# Patient Record
Sex: FEMALE | Race: WHITE | Employment: FULL TIME | URBAN - METROPOLITAN AREA
[De-identification: names, ages, dates, MRNs, and addresses within clinical notes are randomized per-mention and may not be internally consistent; named-entity substitution may affect disease eponyms.]

---
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
## 2020-01-20 ENCOUNTER — HOSPITAL ENCOUNTER (EMERGENCY)
Age: 20
Discharge: HOME | End: 2020-01-20
Attending: EMERGENCY MEDICINE
Payer: COMMERCIAL

## 2020-01-20 VITALS
HEART RATE: 81 BPM | SYSTOLIC BLOOD PRESSURE: 101 MMHG | DIASTOLIC BLOOD PRESSURE: 71 MMHG | TEMPERATURE: 98 F | RESPIRATION RATE: 18 BRPM | OXYGEN SATURATION: 98 %

## 2020-01-20 DIAGNOSIS — N64.59 BREAST ENGORGEMENT: Primary | ICD-10-CM

## 2020-01-20 DIAGNOSIS — S39.012A BACK STRAIN, INITIAL ENCOUNTER: ICD-10-CM

## 2020-01-20 PROCEDURE — 99282 EMERGENCY DEPT VISIT SF MDM: CPT

## 2020-01-21 NOTE — ED INITIAL ASSESSMENT (HPI)
Pt presents to ER complaining of pain to mid back and bilateral breasts for several days. No injury.

## 2020-01-21 NOTE — ED PROVIDER NOTES
Patient Seen in: [facility] In [place]      History   Patient presents with: Other    Stated Complaint: back pain and breast pain    HPI    Patient is about 9 weeks postpartum and has 2 complaints.     She stopped breast-feeding 4 day SpO2 98 %   O2 Device None (Room air)       Current:/71   Pulse 81   Temp 98.2 °F (36.8 °C) (Oral)   Resp 18   LMP  (LMP Unknown)   SpO2 98%         Physical Exam        Constitutional: Awake, alert, age appearing, non-toxic  Head: Normocephalic an Clinical Impression:  Breast engorgement  (primary encounter diagnosis)  Back strain, initial encounter    Disposition:  Discharge  1/20/2020  9:54 pm    Follow-up:  Zoraida Lofton DO  [address] [place] [address] [facility]

## 2020-02-20 ENCOUNTER — HOSPITAL ENCOUNTER (EMERGENCY)
Age: 20
Discharge: HOME | End: 2020-02-20
Attending: EMERGENCY MEDICINE
Payer: COMMERCIAL

## 2020-02-20 ENCOUNTER — APPOINTMENT (OUTPATIENT)
Dept: GENERAL RADIOLOGY | Age: 20
End: 2020-02-20
Attending: EMERGENCY MEDICINE
Payer: COMMERCIAL

## 2020-02-20 VITALS
WEIGHT: 146 LBS | SYSTOLIC BLOOD PRESSURE: 130 MMHG | DIASTOLIC BLOOD PRESSURE: 62 MMHG | HEIGHT: 64 IN | TEMPERATURE: 99 F | BODY MASS INDEX: 24.92 KG/M2 | RESPIRATION RATE: 16 BRPM | HEART RATE: 119 BPM | OXYGEN SATURATION: 99 %

## 2020-02-20 DIAGNOSIS — J40 BRONCHITIS: Primary | ICD-10-CM

## 2020-02-20 LAB
POCT INFLUENZA A: NEGATIVE
POCT INFLUENZA B: NEGATIVE

## 2020-02-20 PROCEDURE — 71046 X-RAY EXAM CHEST 2 VIEWS: CPT | Performed by: EMERGENCY MEDICINE

## 2020-02-20 PROCEDURE — 94644 CONT INHLJ TX 1ST HOUR: CPT

## 2020-02-20 PROCEDURE — 99284 EMERGENCY DEPT VISIT MOD MDM: CPT

## 2020-02-20 PROCEDURE — 87502 INFLUENZA DNA AMP PROBE: CPT | Performed by: EMERGENCY MEDICINE

## 2020-02-20 RX ORDER — PREDNISONE 20 MG/1
40 TABLET ORAL DAILY
Qty: 10 TABLET | Refills: 0 | Status: SHIPPED | OUTPATIENT
Start: 2020-02-20 | End: 2020-02-25

## 2020-02-20 RX ORDER — IBUPROFEN 600 MG/1
600 TABLET ORAL ONCE
Status: COMPLETED | OUTPATIENT
Start: 2020-02-20 | End: 2020-02-20

## 2020-02-20 RX ORDER — ALBUTEROL SULFATE 90 UG/1
2 AEROSOL, METERED RESPIRATORY (INHALATION) EVERY 4 HOURS PRN
Qty: 1 INHALER | Refills: 0 | Status: SHIPPED | OUTPATIENT
Start: 2020-02-20 | End: 2020-03-21

## 2020-02-20 RX ORDER — PREDNISONE 20 MG/1
60 TABLET ORAL ONCE
Status: COMPLETED | OUTPATIENT
Start: 2020-02-20 | End: 2020-02-20

## 2020-02-20 NOTE — ED PROVIDER NOTES
Patient Seen in: [facility] Emergency Department In [place]      History   Patient presents with:  Cough/URI    Stated Complaint: cough congestion fever    HPI    This is a 22-year-old woman, history of asthma, here for evaluation of dry cough sore throat regular rhythm. Pulmonary:      Effort: Pulmonary effort is normal.      Breath sounds: Wheezing present. No rhonchi or rales. Comments: Expiratory wheezing bilaterally. Abdominal:      General: Abdomen is flat. Palpations: Abdomen is soft.

## 2020-02-21 ENCOUNTER — OFFICE VISIT (OUTPATIENT)
Dept: FAMILY MEDICINE CLINIC | Facility: CLINIC | Age: 20
End: 2020-02-21
Payer: COMMERCIAL

## 2020-02-21 VITALS
OXYGEN SATURATION: 98 % | HEART RATE: 80 BPM | RESPIRATION RATE: 14 BRPM | WEIGHT: 134 LBS | TEMPERATURE: 99 F | SYSTOLIC BLOOD PRESSURE: 110 MMHG | BODY MASS INDEX: 22.88 KG/M2 | HEIGHT: 64 IN | DIASTOLIC BLOOD PRESSURE: 60 MMHG

## 2020-02-21 DIAGNOSIS — R06.2 WHEEZING: ICD-10-CM

## 2020-02-21 DIAGNOSIS — R05.9 COUGH: Primary | ICD-10-CM

## 2020-02-21 DIAGNOSIS — R11.10 POST-TUSSIVE EMESIS: ICD-10-CM

## 2020-02-21 PROCEDURE — 94640 AIRWAY INHALATION TREATMENT: CPT | Performed by: FAMILY MEDICINE

## 2020-02-21 PROCEDURE — 99214 OFFICE O/P EST MOD 30 MIN: CPT | Performed by: FAMILY MEDICINE

## 2020-02-21 RX ORDER — CODEINE PHOSPHATE AND GUAIFENESIN 10; 100 MG/5ML; MG/5ML
5 SOLUTION ORAL EVERY 6 HOURS PRN
Qty: 180 ML | Refills: 0 | Status: SHIPPED | OUTPATIENT
Start: 2020-02-21 | End: 2020-07-01

## 2020-02-21 RX ORDER — ONDANSETRON 4 MG/1
4 TABLET, FILM COATED ORAL EVERY 8 HOURS PRN
Qty: 20 TABLET | Refills: 0 | Status: SHIPPED | OUTPATIENT
Start: 2020-02-21 | End: 2020-07-01

## 2020-02-21 RX ORDER — IPRATROPIUM BROMIDE AND ALBUTEROL SULFATE 2.5; .5 MG/3ML; MG/3ML
3 SOLUTION RESPIRATORY (INHALATION) ONCE
Status: COMPLETED | OUTPATIENT
Start: 2020-02-21 | End: 2020-02-21

## 2020-02-21 RX ADMIN — IPRATROPIUM BROMIDE AND ALBUTEROL SULFATE 3 ML: 2.5; .5 SOLUTION RESPIRATORY (INHALATION) at 10:13:00

## 2020-02-21 NOTE — PROGRESS NOTES
Chief Complaint:  Patient presents with:  Er F/u: On 02- Dx Bronchitis, states she is feeling worse, Vomiting, feeling SOB, Coughing up thick Mucus and Chest Tightness     HPI:  This is a 21year old female patient presenting for Er F/u (On 02-20-20 (post-traumatic stress disorder)       Past Surgical History:   Procedure Laterality Date   • REMOVAL OF TONSILS,12+ Y/O     • TONSILLECTOMY        Family History   Problem Relation Age of Onset   • Asthma Mother    • Crohn's Disease Mother    • Depression mg by mouth every 6 (six) hours as needed for Pain.        Allergies:    Penicillin V            RASH  Penicillins             RASH    EXAM:   02/21/20  0945   BP: 110/60   Pulse: 80   Resp: 14   Temp: 98.6 °F (37 °C)   TempSrc: Oral   SpO2: 98%   Weight: 1

## 2020-02-25 ENCOUNTER — OFFICE VISIT (OUTPATIENT)
Dept: FAMILY MEDICINE CLINIC | Facility: CLINIC | Age: 20
End: 2020-02-25
Payer: COMMERCIAL

## 2020-02-25 VITALS
BODY MASS INDEX: 22.88 KG/M2 | DIASTOLIC BLOOD PRESSURE: 60 MMHG | SYSTOLIC BLOOD PRESSURE: 110 MMHG | HEIGHT: 64 IN | OXYGEN SATURATION: 99 % | WEIGHT: 134 LBS | TEMPERATURE: 99 F | RESPIRATION RATE: 16 BRPM | HEART RATE: 80 BPM

## 2020-02-25 DIAGNOSIS — R05.9 COUGH: ICD-10-CM

## 2020-02-25 DIAGNOSIS — J45.41 MODERATE PERSISTENT ASTHMA WITH EXACERBATION: ICD-10-CM

## 2020-02-25 DIAGNOSIS — R06.2 WHEEZING: Primary | ICD-10-CM

## 2020-02-25 PROCEDURE — 99214 OFFICE O/P EST MOD 30 MIN: CPT | Performed by: FAMILY MEDICINE

## 2020-02-25 PROCEDURE — 94640 AIRWAY INHALATION TREATMENT: CPT | Performed by: FAMILY MEDICINE

## 2020-02-25 RX ORDER — PREDNISONE 10 MG/1
TABLET ORAL
Qty: 20 TABLET | Refills: 0 | Status: SHIPPED | OUTPATIENT
Start: 2020-02-25 | End: 2020-03-25

## 2020-02-25 RX ORDER — IPRATROPIUM BROMIDE AND ALBUTEROL SULFATE 2.5; .5 MG/3ML; MG/3ML
3 SOLUTION RESPIRATORY (INHALATION) ONCE
Status: COMPLETED | OUTPATIENT
Start: 2020-02-25 | End: 2020-02-25

## 2020-02-25 RX ADMIN — IPRATROPIUM BROMIDE AND ALBUTEROL SULFATE 3 ML: 2.5; .5 SOLUTION RESPIRATORY (INHALATION) at 11:32:00

## 2020-02-25 NOTE — PROGRESS NOTES
Chief Complaint:  Patient presents with:  Cough:  Follow Up from 02--states cough and wheezing is worsening, Chest Pain and congestion     HPI:  This is a 21year old female patient presenting for Cough (Follow Up from 02--states cough and whe Onset   • Asthma Mother    • Crohn's Disease Mother    • Depression Maternal Grandmother    • Bipolar Disorder Maternal Grandmother    • Heart Disease Paternal Grandfather    • Diabetes Paternal Grandfather    • Bipolar Disorder Maternal Aunt    • Claudeen Sides (four) hours as needed for Wheezing. 1 Box 1   • Ipratropium-Albuterol  MCG/ACT Inhalation Aero Soln Inhale 2 puffs into the lungs every 4 to 6 hours.  1 Inhaler 1   • acetaminophen 325 MG Oral Tab Take 325 mg by mouth every 6 (six) hours as needed fo taper: predniSONE 10 MG Oral Tab; Take 3 tabs PO x 3d, then 2 tabs PO x 3d, then 1 tab PO x3d, then 1/2 tab PO x 2 d.  -    Obtain XR CHEST PA + LAT CHEST (CPT=71046); Future given continued symptoms.     Concerning signs and symptoms that warrant returning

## 2020-03-25 ENCOUNTER — TELEPHONE (OUTPATIENT)
Dept: FAMILY MEDICINE CLINIC | Facility: CLINIC | Age: 20
End: 2020-03-25

## 2020-03-25 ENCOUNTER — HOSPITAL ENCOUNTER (EMERGENCY)
Age: 20
Discharge: HOME | End: 2020-03-25
Attending: EMERGENCY MEDICINE
Payer: COMMERCIAL

## 2020-03-25 ENCOUNTER — APPOINTMENT (OUTPATIENT)
Dept: GENERAL RADIOLOGY | Age: 20
End: 2020-03-25
Attending: EMERGENCY MEDICINE
Payer: COMMERCIAL

## 2020-03-25 VITALS
HEIGHT: 64 IN | BODY MASS INDEX: 22.88 KG/M2 | SYSTOLIC BLOOD PRESSURE: 106 MMHG | HEART RATE: 96 BPM | TEMPERATURE: 99 F | RESPIRATION RATE: 18 BRPM | WEIGHT: 134 LBS | OXYGEN SATURATION: 98 % | DIASTOLIC BLOOD PRESSURE: 62 MMHG

## 2020-03-25 DIAGNOSIS — J06.9 UPPER RESPIRATORY TRACT INFECTION, UNSPECIFIED TYPE: Primary | ICD-10-CM

## 2020-03-25 DIAGNOSIS — J45.901 MODERATE ASTHMA WITH EXACERBATION, UNSPECIFIED WHETHER PERSISTENT: Primary | ICD-10-CM

## 2020-03-25 PROCEDURE — 99441 PHONE E/M BY PHYS 5-10 MIN: CPT | Performed by: FAMILY MEDICINE

## 2020-03-25 PROCEDURE — 99283 EMERGENCY DEPT VISIT LOW MDM: CPT

## 2020-03-25 PROCEDURE — 71045 X-RAY EXAM CHEST 1 VIEW: CPT | Performed by: EMERGENCY MEDICINE

## 2020-03-25 RX ORDER — PREDNISONE 20 MG/1
40 TABLET ORAL DAILY
Qty: 10 TABLET | Refills: 0 | Status: SHIPPED | OUTPATIENT
Start: 2020-03-25 | End: 2020-03-30

## 2020-03-25 RX ORDER — PREDNISONE 20 MG/1
60 TABLET ORAL DAILY
Qty: 12 TABLET | Refills: 0 | Status: SHIPPED | OUTPATIENT
Start: 2020-03-25 | End: 2020-03-29

## 2020-03-25 RX ORDER — PREDNISONE 20 MG/1
60 TABLET ORAL ONCE
Status: COMPLETED | OUTPATIENT
Start: 2020-03-25 | End: 2020-03-25

## 2020-03-25 NOTE — TELEPHONE ENCOUNTER
Virtual/Telephone Check-In    Regan Deems verbally consents to a Virtual/Telephone Check-In service on 3/25/2020. Patient understands and accepts financial responsibility for any deductible, co-insurance and/or co-pays associated with this service.

## 2020-03-25 NOTE — TELEPHONE ENCOUNTER
Patient presents with:  Sick Call: Wheezing, chest pain, cough, vomiting, and fever     Virtual Check-In    Phong Odom verbally consents to a [employer] on 03/25/20.   Patient understands and accepts financial responsibility for any deduct

## 2020-03-25 NOTE — ED PROVIDER NOTES
Patient Seen in: [facility] In [place]      History   Patient presents with:  Cough/URI    Stated Complaint: cough, wiley, fever    HPI    Patient is a 80-year-old female with a history of some asthma presenting with respiratory symptom Pupils are equal, round, and reactive to light. Cardiovascular:      Rate and Rhythm: Normal rate and regular rhythm. Heart sounds: Normal heart sounds. Pulmonary:      Effort: Pulmonary effort is normal.      Breath sounds: Normal breath sounds. Jono Ribeiro MD on 3/25/2020 at 4:18 PM     Finalized by: Sunil Abbott MD on 3/25/2020 at 4:23 PM                MDM     78-year-old female with history of asthma presenting with about a week of sore throat and dry cough, 4 days of fevers to 101 at home.   No G

## 2020-03-26 ENCOUNTER — TELEPHONE (OUTPATIENT)
Dept: FAMILY MEDICINE CLINIC | Facility: CLINIC | Age: 20
End: 2020-03-26

## 2020-03-26 NOTE — TELEPHONE ENCOUNTER
Spoke with patient and informed her that the pharmacy is unlikely to refill this medication since she just filled it. Has about 20mL left in bottle which would be 4 doses. Hopefully she will be feeling better at that time.  To take only at nighttime (5mL) a
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
## 2020-03-27 ENCOUNTER — TELEPHONE (OUTPATIENT)
Dept: FAMILY MEDICINE CLINIC | Facility: CLINIC | Age: 20
End: 2020-03-27

## 2020-03-27 DIAGNOSIS — J45.901 MODERATE ASTHMA WITH EXACERBATION, UNSPECIFIED WHETHER PERSISTENT: Primary | ICD-10-CM

## 2020-03-27 RX ORDER — IPRATROPIUM BROMIDE AND ALBUTEROL SULFATE 2.5; .5 MG/3ML; MG/3ML
3 SOLUTION RESPIRATORY (INHALATION) EVERY 6 HOURS PRN
Qty: 120 ML | Refills: 5 | Status: SHIPPED | OUTPATIENT
Start: 2020-03-27 | End: 2020-10-28

## 2020-03-27 NOTE — TELEPHONE ENCOUNTER
Spoke with patient. Dispite nebs, inhaler, prednisone, cough medicine and hydrocodone, pt feel here condition is getting worse. Her cough is more severe. She is on day 7 of persistant fevere that ranges between 100-101 and spikes to 102 at night.

## 2020-03-27 NOTE — TELEPHONE ENCOUNTER
Spoke with patient. Will change to duonebs for use to alternate with albuterol nebs. Reassuring she is feeling better today compared to last night.  Concerning signs and symptoms that warrant presenting to the ED reviewed and patient demonstrated understand

## 2020-03-30 ENCOUNTER — TELEPHONE (OUTPATIENT)
Dept: FAMILY MEDICINE CLINIC | Facility: CLINIC | Age: 20
End: 2020-03-30

## 2020-03-30 DIAGNOSIS — J45.41 MODERATE PERSISTENT ASTHMA WITH EXACERBATION: Primary | ICD-10-CM

## 2020-03-30 DIAGNOSIS — J06.9 VIRAL URI WITH COUGH: ICD-10-CM

## 2020-03-30 PROCEDURE — 99213 OFFICE O/P EST LOW 20 MIN: CPT | Performed by: FAMILY MEDICINE

## 2020-03-30 RX ORDER — ALBUTEROL SULFATE 90 UG/1
2 AEROSOL, METERED RESPIRATORY (INHALATION) EVERY 6 HOURS PRN
Qty: 1 INHALER | Refills: 0 | Status: SHIPPED | OUTPATIENT
Start: 2020-03-30

## 2020-03-30 NOTE — TELEPHONE ENCOUNTER
Virtual Check-In    Phong Odom verbally consents to a [employer] on 03/30/20. Patient understands and accepts financial responsibility for any deductible, co-insurance and/or co-pays associated with this service.     Duration of the servi

## 2020-03-30 NOTE — TELEPHONE ENCOUNTER
Patient called to give a symptom update, requesting to speak with Dr Joao Agustin, also to discuss her medication.

## 2020-04-03 ENCOUNTER — HOSPITAL ENCOUNTER (EMERGENCY)
Age: 20
Discharge: HOME | End: 2020-04-04
Attending: EMERGENCY MEDICINE
Payer: COMMERCIAL

## 2020-04-03 DIAGNOSIS — G43.809 OTHER MIGRAINE WITHOUT STATUS MIGRAINOSUS, NOT INTRACTABLE: Primary | ICD-10-CM

## 2020-04-03 PROCEDURE — 96361 HYDRATE IV INFUSION ADD-ON: CPT

## 2020-04-03 PROCEDURE — 96374 THER/PROPH/DIAG INJ IV PUSH: CPT

## 2020-04-03 PROCEDURE — 99284 EMERGENCY DEPT VISIT MOD MDM: CPT

## 2020-04-03 PROCEDURE — 81025 URINE PREGNANCY TEST: CPT

## 2020-04-03 PROCEDURE — 96376 TX/PRO/DX INJ SAME DRUG ADON: CPT

## 2020-04-03 PROCEDURE — 96375 TX/PRO/DX INJ NEW DRUG ADDON: CPT

## 2020-04-03 RX ORDER — DIPHENHYDRAMINE HYDROCHLORIDE 50 MG/ML
25 INJECTION INTRAMUSCULAR; INTRAVENOUS ONCE
Status: COMPLETED | OUTPATIENT
Start: 2020-04-03 | End: 2020-04-03

## 2020-04-03 RX ORDER — KETOROLAC TROMETHAMINE 30 MG/ML
15 INJECTION, SOLUTION INTRAMUSCULAR; INTRAVENOUS ONCE
Status: COMPLETED | OUTPATIENT
Start: 2020-04-03 | End: 2020-04-03

## 2020-04-03 RX ORDER — METOCLOPRAMIDE HYDROCHLORIDE 5 MG/ML
5 INJECTION INTRAMUSCULAR; INTRAVENOUS ONCE
Status: COMPLETED | OUTPATIENT
Start: 2020-04-03 | End: 2020-04-03

## 2020-04-04 VITALS
HEART RATE: 66 BPM | WEIGHT: 138 LBS | RESPIRATION RATE: 18 BRPM | OXYGEN SATURATION: 100 % | HEIGHT: 64 IN | SYSTOLIC BLOOD PRESSURE: 110 MMHG | TEMPERATURE: 98 F | BODY MASS INDEX: 23.56 KG/M2 | DIASTOLIC BLOOD PRESSURE: 68 MMHG

## 2020-04-04 RX ORDER — ONDANSETRON 4 MG/1
4 TABLET, ORALLY DISINTEGRATING ORAL EVERY 4 HOURS PRN
Qty: 10 TABLET | Refills: 0 | Status: SHIPPED | OUTPATIENT
Start: 2020-04-04 | End: 2021-02-23

## 2020-04-04 RX ORDER — LORAZEPAM 2 MG/ML
0.5 INJECTION INTRAMUSCULAR ONCE
Status: COMPLETED | OUTPATIENT
Start: 2020-04-04 | End: 2020-04-04

## 2020-04-04 NOTE — ED NOTES
Rounded on patient, patient appears to be resting comfortably. This RN startled patient when calling name. Patient denies any pain relief, MD notified.

## 2020-04-04 NOTE — ED NOTES
Patient arrived to room with mask, visitor was masked as well. This RN entered room with gloves, mask, and eye protective goggles.

## 2020-04-04 NOTE — ED PROVIDER NOTES
Patient Seen in: [facility] Emergency Department In [place]      History   Patient presents with:  Headache    Stated Complaint: migraine headache    HPI    This is a 55-year-old female who presents with migraine headache which began at 5 AM today.   She i BMI 23.69 kg/m²         Physical Exam    GENERAL: Awake, alert oriented x3, nontoxic appearing. SKIN: Normal, warm, and dry. HEENT:  Pupils equally round and reactive to light. Conjuctiva clear.   Oropharynx is clear and moist.   Lungs: Clear to ausculta

## 2020-04-04 NOTE — ED INITIAL ASSESSMENT (HPI)
22 y/o female to ED with c/o of migraine. Patient reports she has a hx of migraines, prescribed medicines, fioricet not relieving pain. Patient reports migraine started today, has worsened the last hour.

## 2020-05-06 ENCOUNTER — HOSPITAL ENCOUNTER (EMERGENCY)
Age: 20
Discharge: HOME | End: 2020-05-06
Attending: EMERGENCY MEDICINE
Payer: COMMERCIAL

## 2020-05-06 ENCOUNTER — APPOINTMENT (OUTPATIENT)
Dept: GENERAL RADIOLOGY | Age: 20
End: 2020-05-06
Attending: EMERGENCY MEDICINE
Payer: COMMERCIAL

## 2020-05-06 VITALS
BODY MASS INDEX: 21.34 KG/M2 | WEIGHT: 125 LBS | HEART RATE: 54 BPM | SYSTOLIC BLOOD PRESSURE: 98 MMHG | TEMPERATURE: 98 F | OXYGEN SATURATION: 99 % | HEIGHT: 64 IN | RESPIRATION RATE: 18 BRPM | DIASTOLIC BLOOD PRESSURE: 54 MMHG

## 2020-05-06 DIAGNOSIS — N30.00 ACUTE CYSTITIS WITHOUT HEMATURIA: ICD-10-CM

## 2020-05-06 DIAGNOSIS — K52.9 GASTROENTERITIS: Primary | ICD-10-CM

## 2020-05-06 PROCEDURE — 87088 URINE BACTERIA CULTURE: CPT | Performed by: EMERGENCY MEDICINE

## 2020-05-06 PROCEDURE — 99284 EMERGENCY DEPT VISIT MOD MDM: CPT

## 2020-05-06 PROCEDURE — 71045 X-RAY EXAM CHEST 1 VIEW: CPT | Performed by: EMERGENCY MEDICINE

## 2020-05-06 PROCEDURE — 85025 COMPLETE CBC W/AUTO DIFF WBC: CPT | Performed by: EMERGENCY MEDICINE

## 2020-05-06 PROCEDURE — 87086 URINE CULTURE/COLONY COUNT: CPT | Performed by: EMERGENCY MEDICINE

## 2020-05-06 PROCEDURE — 81001 URINALYSIS AUTO W/SCOPE: CPT | Performed by: EMERGENCY MEDICINE

## 2020-05-06 PROCEDURE — 83690 ASSAY OF LIPASE: CPT | Performed by: EMERGENCY MEDICINE

## 2020-05-06 PROCEDURE — 82962 GLUCOSE BLOOD TEST: CPT

## 2020-05-06 PROCEDURE — 87186 SC STD MICRODIL/AGAR DIL: CPT | Performed by: EMERGENCY MEDICINE

## 2020-05-06 PROCEDURE — 96361 HYDRATE IV INFUSION ADD-ON: CPT

## 2020-05-06 PROCEDURE — 80053 COMPREHEN METABOLIC PANEL: CPT | Performed by: EMERGENCY MEDICINE

## 2020-05-06 PROCEDURE — 96374 THER/PROPH/DIAG INJ IV PUSH: CPT

## 2020-05-06 RX ORDER — SULFAMETHOXAZOLE AND TRIMETHOPRIM 800; 160 MG/1; MG/1
1 TABLET ORAL 2 TIMES DAILY
Qty: 6 TABLET | Refills: 0 | Status: SHIPPED | OUTPATIENT
Start: 2020-05-06 | End: 2020-05-09

## 2020-05-06 RX ORDER — ONDANSETRON 2 MG/ML
4 INJECTION INTRAMUSCULAR; INTRAVENOUS ONCE
Status: COMPLETED | OUTPATIENT
Start: 2020-05-06 | End: 2020-05-06

## 2020-05-06 RX ORDER — DICYCLOMINE HCL 20 MG
20 TABLET ORAL 4 TIMES DAILY PRN
Qty: 30 TABLET | Refills: 0 | Status: SHIPPED | OUTPATIENT
Start: 2020-05-06 | End: 2020-06-05

## 2020-05-06 RX ORDER — ONDANSETRON 4 MG/1
4 TABLET, ORALLY DISINTEGRATING ORAL EVERY 4 HOURS PRN
Qty: 10 TABLET | Refills: 0 | Status: SHIPPED | OUTPATIENT
Start: 2020-05-06 | End: 2020-05-13

## 2020-05-06 NOTE — ED PROVIDER NOTES
Patient Seen in: [facility] In [place]      History   Patient presents with:  Abdomen/Flank Pain    Stated Complaint: abd pain    HPI    Patient is a 42-year-old female who presents for evaluation of diffuse crampy abdominal pain, vom is well-developed. HENT:      Head: Normocephalic and atraumatic. Eyes:      Conjunctiva/sclera: Conjunctivae normal.      Pupils: Pupils are equal, round, and reactive to light. Neck:      Musculoskeletal: Normal range of motion and neck supple.    C result                 Please view results for these tests on the individual orders.    RAINBOW DRAW LAVENDER   RAINBOW DRAW LIGHT GREEN   RAINBOW DRAW GOLD   SARS-COV-2 BY PCR   URINE CULTURE, ROUTINE   CBC W/ DIFFERENTIAL          Xr Chest Ap Portable  (c pancreatitis. Urinalysis is suggestive UTI although she has not had any symptoms of cystitis such as dysuria, increased frequency or urgency, she does have white cells, bacteria, minimal epithelials. Culture sent but will plan to treat her.   Covert swab

## 2020-05-08 ENCOUNTER — HOSPITAL ENCOUNTER (EMERGENCY)
Age: 20
Discharge: HOME | End: 2020-05-08
Attending: EMERGENCY MEDICINE
Payer: COMMERCIAL

## 2020-05-08 ENCOUNTER — APPOINTMENT (OUTPATIENT)
Dept: CT IMAGING | Age: 20
End: 2020-05-08
Attending: EMERGENCY MEDICINE
Payer: COMMERCIAL

## 2020-05-08 VITALS
TEMPERATURE: 99 F | BODY MASS INDEX: 21.34 KG/M2 | DIASTOLIC BLOOD PRESSURE: 73 MMHG | HEIGHT: 64 IN | WEIGHT: 125 LBS | HEART RATE: 68 BPM | OXYGEN SATURATION: 99 % | RESPIRATION RATE: 18 BRPM | SYSTOLIC BLOOD PRESSURE: 120 MMHG

## 2020-05-08 DIAGNOSIS — R10.31 ABDOMINAL PAIN, RIGHT LOWER QUADRANT: Primary | ICD-10-CM

## 2020-05-08 PROCEDURE — 74177 CT ABD & PELVIS W/CONTRAST: CPT | Performed by: EMERGENCY MEDICINE

## 2020-05-08 PROCEDURE — 99284 EMERGENCY DEPT VISIT MOD MDM: CPT

## 2020-05-08 PROCEDURE — 81025 URINE PREGNANCY TEST: CPT

## 2020-05-08 PROCEDURE — 96375 TX/PRO/DX INJ NEW DRUG ADDON: CPT

## 2020-05-08 PROCEDURE — 96361 HYDRATE IV INFUSION ADD-ON: CPT

## 2020-05-08 PROCEDURE — 83690 ASSAY OF LIPASE: CPT | Performed by: EMERGENCY MEDICINE

## 2020-05-08 PROCEDURE — 81003 URINALYSIS AUTO W/O SCOPE: CPT | Performed by: EMERGENCY MEDICINE

## 2020-05-08 PROCEDURE — 96374 THER/PROPH/DIAG INJ IV PUSH: CPT

## 2020-05-08 RX ORDER — KETOROLAC TROMETHAMINE 30 MG/ML
15 INJECTION, SOLUTION INTRAMUSCULAR; INTRAVENOUS ONCE
Status: COMPLETED | OUTPATIENT
Start: 2020-05-08 | End: 2020-05-08

## 2020-05-08 RX ORDER — METOCLOPRAMIDE HYDROCHLORIDE 5 MG/ML
10 INJECTION INTRAMUSCULAR; INTRAVENOUS ONCE
Status: COMPLETED | OUTPATIENT
Start: 2020-05-08 | End: 2020-05-08

## 2020-05-08 RX ORDER — KETOROLAC TROMETHAMINE 10 MG/1
10 TABLET, FILM COATED ORAL EVERY 6 HOURS PRN
Qty: 30 TABLET | Refills: 0 | Status: SHIPPED | OUTPATIENT
Start: 2020-05-08 | End: 2020-05-15

## 2020-05-08 RX ORDER — METOCLOPRAMIDE 10 MG/1
10 TABLET ORAL 3 TIMES DAILY PRN
Qty: 20 TABLET | Refills: 0 | Status: SHIPPED | OUTPATIENT
Start: 2020-05-08 | End: 2020-06-07

## 2020-05-08 RX ORDER — DIPHENHYDRAMINE HYDROCHLORIDE 50 MG/ML
25 INJECTION INTRAMUSCULAR; INTRAVENOUS ONCE
Status: COMPLETED | OUTPATIENT
Start: 2020-05-08 | End: 2020-05-08

## 2020-05-09 NOTE — ED INITIAL ASSESSMENT (HPI)
r sided abd pain with vomiting x 4 days. See in ed 2 days ago dx with uti and gastroenteritis.  abd pain and vomiting worsening, unable to tolerate oral.

## 2020-05-09 NOTE — ED NOTES
Pt and family informed of f/u and d/c instructions, iv removed area intact, pt able to ambulate out.

## 2020-05-09 NOTE — ED PROVIDER NOTES
Patient Seen in: [facility] Department In [place]      History   Patient presents with:  Abdomen/Flank Pain    Stated Complaint: abd pain-in ed 2 days ago, pain worse    HPI    24-year-old woman who comes emergency department with 4 days of wors above McBurney's point. No abdominal masses.   No peritoneal signs   Extremities: no edema, normal peripheral pulses   Neuro: Alert oriented and nonfocal           ED Course     Labs Reviewed   URINALYSIS WITH CULTURE REFLEX - Abnormal; Notable for the fol Small nonspecific free fluid in the pelvis. Involuting follicles left ovary. No adnexal mass. No acute disease process identified in the abdomen, pelvis.     Dictated by: Sky Foley MD on 5/08/2020 at 9:27 PM     Finalized by: Sky Foley MD on

## 2020-05-14 ENCOUNTER — HOSPITAL ENCOUNTER (EMERGENCY)
Age: 20
Discharge: HOME | End: 2020-05-14
Attending: EMERGENCY MEDICINE
Payer: COMMERCIAL

## 2020-05-14 VITALS
OXYGEN SATURATION: 98 % | HEIGHT: 64 IN | SYSTOLIC BLOOD PRESSURE: 111 MMHG | DIASTOLIC BLOOD PRESSURE: 67 MMHG | RESPIRATION RATE: 16 BRPM | BODY MASS INDEX: 21.34 KG/M2 | TEMPERATURE: 99 F | HEART RATE: 82 BPM | WEIGHT: 125 LBS

## 2020-05-14 DIAGNOSIS — T23.101A BURN, HAND, FIRST DEGREE, RIGHT, INITIAL ENCOUNTER: Primary | ICD-10-CM

## 2020-05-14 PROCEDURE — 16000 INITIAL TREATMENT OF BURN(S): CPT

## 2020-05-14 PROCEDURE — 99283 EMERGENCY DEPT VISIT LOW MDM: CPT

## 2020-05-14 RX ORDER — LIDOCAINE AND PRILOCAINE 25; 25 MG/G; MG/G
CREAM TOPICAL ONCE
Status: COMPLETED | OUTPATIENT
Start: 2020-05-14 | End: 2020-05-14

## 2020-05-14 RX ORDER — NAPROXEN 500 MG/1
500 TABLET ORAL 2 TIMES DAILY PRN
Qty: 20 TABLET | Refills: 0 | Status: SHIPPED | OUTPATIENT
Start: 2020-05-14 | End: 2020-05-21

## 2020-05-14 RX ORDER — TRAMADOL HYDROCHLORIDE 50 MG/1
TABLET ORAL EVERY 6 HOURS PRN
Qty: 10 TABLET | Refills: 0 | Status: SHIPPED | OUTPATIENT
Start: 2020-05-14 | End: 2020-05-21

## 2020-05-14 RX ORDER — IBUPROFEN 600 MG/1
600 TABLET ORAL ONCE
Status: COMPLETED | OUTPATIENT
Start: 2020-05-14 | End: 2020-05-14

## 2020-05-14 RX ORDER — HYDROCODONE BITARTRATE AND ACETAMINOPHEN 5; 325 MG/1; MG/1
1 TABLET ORAL ONCE
Status: COMPLETED | OUTPATIENT
Start: 2020-05-14 | End: 2020-05-14
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
## 2020-05-19 ENCOUNTER — TELEPHONE (OUTPATIENT)
Dept: FAMILY MEDICINE CLINIC | Facility: CLINIC | Age: 20
End: 2020-05-19
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
## 2020-06-15 ENCOUNTER — TELEPHONE (OUTPATIENT)
Dept: OBGYN CLINIC | Facility: CLINIC | Age: 20
End: 2020-06-15

## 2020-06-15 NOTE — TELEPHONE ENCOUNTER
Patient calling to initiate prenatal care  LMP 05-17-20  Patient is 7-8 weeks [id number]   Confirmation Ultrasound and Appointment scheduled on 07-01-20  Insurance [employer] PPO   Good time to return phone call ANYTIME

## 2020-07-01 ENCOUNTER — OFFICE VISIT (OUTPATIENT)
Dept: OBGYN CLINIC | Facility: CLINIC | Age: 20
End: 2020-07-01
Payer: COMMERCIAL

## 2020-07-01 ENCOUNTER — ULTRASOUND ENCOUNTER (OUTPATIENT)
Dept: OBGYN CLINIC | Facility: CLINIC | Age: 20
End: 2020-07-01
Payer: COMMERCIAL

## 2020-07-01 VITALS
DIASTOLIC BLOOD PRESSURE: 68 MMHG | HEIGHT: 64.5 IN | TEMPERATURE: 99 F | SYSTOLIC BLOOD PRESSURE: 116 MMHG | BODY MASS INDEX: 21.42 KG/M2 | WEIGHT: 127 LBS

## 2020-07-01 DIAGNOSIS — Z32.01 PREGNANCY EXAMINATION OR TEST, POSITIVE RESULT: ICD-10-CM

## 2020-07-01 DIAGNOSIS — R35.0 URINARY FREQUENCY: ICD-10-CM

## 2020-07-01 DIAGNOSIS — N91.2 AMENORRHEA: Primary | ICD-10-CM

## 2020-07-01 LAB
APPEARANCE: CLEAR
MULTISTIX LOT#: 1044 NUMERIC
PH, URINE: 6 (ref 4.5–8)
SPECIFIC GRAVITY: <=1.005 (ref 1–1.03)
URINE-COLOR: YELLOW
UROBILINOGEN,SEMI-QN: 0.2 MG/DL (ref 0–1.9)

## 2020-07-01 PROCEDURE — 81002 URINALYSIS NONAUTO W/O SCOPE: CPT | Performed by: OBSTETRICS & GYNECOLOGY

## 2020-07-01 PROCEDURE — 87086 URINE CULTURE/COLONY COUNT: CPT | Performed by: OBSTETRICS & GYNECOLOGY

## 2020-07-01 PROCEDURE — 99203 OFFICE O/P NEW LOW 30 MIN: CPT | Performed by: OBSTETRICS & GYNECOLOGY

## 2020-07-01 PROCEDURE — 76830 TRANSVAGINAL US NON-OB: CPT | Performed by: OBSTETRICS & GYNECOLOGY

## 2020-07-01 NOTE — PROGRESS NOTES
[facility]  Obstetrics and Gynecology     Subjective:     Mary Christian is a 21year old  female presents with c/o secondary amenorrhea and positive pregnancy test. The patient was recommended to return for further evaluation.  The total) by mouth every 2 (two) hours as needed for Migraine (No more than 2 doses in 24 hours). , Disp: 10 tablet, Rfl: 0  Prenatal MV-Min-Fe Fum-FA-DHA (PRENATAL 1 OR), Take by mouth., Disp: , Rfl:   albuterol sulfate (2.5 MG/3ML) 0.083% Inhalation Nebu Sol Ankle injury     Syncope, vasovagal     IUD (intrauterine device) in place        Plan:     Secondary amenorrhea  - a/w positive pregnancy test  - US noted for single viable IUP at 6w4d   - Pt counseled on safety, diet, exercise, caffiene, tobacco, ETOH, s

## 2020-07-01 NOTE — PATIENT INSTRUCTIONS
[place] 02/20/21  6w4d  Genetic screening     Please choose one option.     1) First trimester screening:  - Performed at 13 weeks and includes blood test and ultrasound  - Screens for Trisomy 13, 18 and 21 (Down Syndrome)   - You will need to schedule an appoi Cystic Fibrosis carrier screening    - Performed at any gestational age and includes a blood test   - Please verify insurance coverage:   CPT code: 61068  Diagnosis code: Cystic fibrosis screening - Z13.228    ---------------------------------------------- Insomnia: Tylenol PM, Vitamin B6 50mg, warm bath or milk, Unisom, Nytol or Sominex. We have listed a few medications for common symptoms seen in pregnancy.   Please contact the office if you are unsure about any over the counter medications that are not

## 2020-07-11 ENCOUNTER — HOSPITAL ENCOUNTER (OUTPATIENT)
Age: 20
Discharge: HOME | End: 2020-07-11
Payer: COMMERCIAL

## 2020-07-11 ENCOUNTER — APPOINTMENT (OUTPATIENT)
Dept: GENERAL RADIOLOGY | Age: 20
End: 2020-07-11
Attending: PHYSICIAN ASSISTANT
Payer: COMMERCIAL

## 2020-07-11 VITALS
WEIGHT: 127 LBS | OXYGEN SATURATION: 99 % | HEIGHT: 64 IN | BODY MASS INDEX: 21.68 KG/M2 | SYSTOLIC BLOOD PRESSURE: 110 MMHG | RESPIRATION RATE: 16 BRPM | HEART RATE: 88 BPM | DIASTOLIC BLOOD PRESSURE: 64 MMHG | TEMPERATURE: 100 F

## 2020-07-11 DIAGNOSIS — S69.92XA INJURY OF LEFT HAND, INITIAL ENCOUNTER: Primary | ICD-10-CM

## 2020-07-11 PROCEDURE — 99203 OFFICE O/P NEW LOW 30 MIN: CPT | Performed by: PHYSICIAN ASSISTANT

## 2020-07-11 PROCEDURE — 73130 X-RAY EXAM OF HAND: CPT | Performed by: PHYSICIAN ASSISTANT
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
## 2020-07-14 ENCOUNTER — OFFICE VISIT (OUTPATIENT)
Dept: FAMILY MEDICINE CLINIC | Facility: CLINIC | Age: 20
End: 2020-07-14
Payer: COMMERCIAL

## 2020-07-14 VITALS
RESPIRATION RATE: 16 BRPM | BODY MASS INDEX: 22.11 KG/M2 | WEIGHT: 129.5 LBS | SYSTOLIC BLOOD PRESSURE: 98 MMHG | TEMPERATURE: 97 F | HEART RATE: 74 BPM | DIASTOLIC BLOOD PRESSURE: 60 MMHG | HEIGHT: 64 IN

## 2020-07-14 DIAGNOSIS — R10.10 PAIN OF UPPER ABDOMEN: Primary | ICD-10-CM

## 2020-07-14 DIAGNOSIS — M25.532 LEFT WRIST PAIN: ICD-10-CM

## 2020-07-14 DIAGNOSIS — R11.2 NON-INTRACTABLE VOMITING WITH NAUSEA, UNSPECIFIED VOMITING TYPE: ICD-10-CM

## 2020-07-14 DIAGNOSIS — Z3A.01 LESS THAN 8 WEEKS GESTATION OF PREGNANCY: ICD-10-CM

## 2020-07-14 PROCEDURE — 99214 OFFICE O/P EST MOD 30 MIN: CPT | Performed by: NURSE PRACTITIONER

## 2020-07-14 NOTE — PROGRESS NOTES
Patient presents with:  Abdominal Pain: x 3 weeks, sharp burning pain, has been on both sides, goes into the back   Pain: injured wrist and would like to discuss       HPI:  Presents with approx 3 week history of upper abd pain with radiation to her back. Medical History:   Diagnosis Date   • Acetaminophen overdose    • Anxiety    • Asthma    • Depression    • Extrinsic asthma, unspecified    • Migraines    • Psychiatric disorder    • PTSD (post-traumatic stress disorder)        Patient Active Problem List: normal. Breath sounds clear bilaterally. No wheezes, rhonchi or rales  Abdominal: Soft, flat, non-distended, non-tender, w/o guarding, rebound, masses, organomegaly or hernias. No CVA tenderness. BS normoactive.    Ext: noted with point tenderness to left s understands the plan.

## 2020-07-16 ENCOUNTER — APPOINTMENT (OUTPATIENT)
Dept: ULTRASOUND IMAGING | Age: 20
End: 2020-07-16
Attending: EMERGENCY MEDICINE
Payer: COMMERCIAL

## 2020-07-16 ENCOUNTER — HOSPITAL ENCOUNTER (EMERGENCY)
Age: 20
Discharge: HOME | End: 2020-07-16
Attending: EMERGENCY MEDICINE
Payer: COMMERCIAL

## 2020-07-16 ENCOUNTER — TELEPHONE (OUTPATIENT)
Dept: OBGYN CLINIC | Facility: CLINIC | Age: 20
End: 2020-07-16

## 2020-07-16 VITALS
TEMPERATURE: 98 F | OXYGEN SATURATION: 99 % | DIASTOLIC BLOOD PRESSURE: 56 MMHG | WEIGHT: 129 LBS | SYSTOLIC BLOOD PRESSURE: 101 MMHG | BODY MASS INDEX: 22.02 KG/M2 | HEIGHT: 64 IN | HEART RATE: 65 BPM | RESPIRATION RATE: 16 BRPM

## 2020-07-16 DIAGNOSIS — R10.13 ABDOMINAL PAIN, EPIGASTRIC: Primary | ICD-10-CM

## 2020-07-16 DIAGNOSIS — K21.9 GASTROESOPHAGEAL REFLUX DISEASE, ESOPHAGITIS PRESENCE NOT SPECIFIED: ICD-10-CM

## 2020-07-16 DIAGNOSIS — Z34.90 PREGNANCY, UNSPECIFIED GESTATIONAL AGE: ICD-10-CM

## 2020-07-16 LAB
ALBUMIN SERPL-MCNC: 3.5 G/DL (ref 3.4–5)
ALBUMIN/GLOB SERPL: 1 {RATIO} (ref 1–2)
ALP LIVER SERPL-CCNC: 71 U/L (ref 52–144)
ALT SERPL-CCNC: 9 U/L (ref 13–56)
ANION GAP SERPL CALC-SCNC: 7 MMOL/L (ref 0–18)
AST SERPL-CCNC: 10 U/L (ref 15–37)
BASOPHILS # BLD AUTO: 0.02 X10(3) UL (ref 0–0.2)
BASOPHILS NFR BLD AUTO: 0.3 %
BILIRUB SERPL-MCNC: 0.4 MG/DL (ref 0.1–2)
BILIRUB UR QL STRIP.AUTO: NEGATIVE
BUN BLD-MCNC: 7 MG/DL (ref 7–18)
BUN/CREAT SERPL: 14.3 (ref 10–20)
CALCIUM BLD-MCNC: 8.3 MG/DL (ref 8.5–10.1)
CHLORIDE SERPL-SCNC: 105 MMOL/L (ref 98–112)
CLARITY UR REFRACT.AUTO: CLEAR
CO2 SERPL-SCNC: 22 MMOL/L (ref 21–32)
COLOR UR AUTO: YELLOW
CREAT BLD-MCNC: 0.49 MG/DL (ref 0.55–1.02)
DEPRECATED RDW RBC AUTO: 42.7 FL (ref 35.1–46.3)
EOSINOPHIL # BLD AUTO: 0.14 X10(3) UL (ref 0–0.7)
EOSINOPHIL NFR BLD AUTO: 2 %
ERYTHROCYTE [DISTWIDTH] IN BLOOD BY AUTOMATED COUNT: 13.8 % (ref 11–15)
GLOBULIN PLAS-MCNC: 3.5 G/DL (ref 2.8–4.4)
GLUCOSE BLD-MCNC: 89 MG/DL (ref 70–99)
GLUCOSE UR STRIP.AUTO-MCNC: NEGATIVE MG/DL
HCT VFR BLD AUTO: 38.4 % (ref 35–48)
HGB BLD-MCNC: 12.6 G/DL (ref 12–16)
IMM GRANULOCYTES # BLD AUTO: 0.04 X10(3) UL (ref 0–1)
IMM GRANULOCYTES NFR BLD: 0.6 %
KETONES UR STRIP.AUTO-MCNC: NEGATIVE MG/DL
LEUKOCYTE ESTERASE UR QL STRIP.AUTO: NEGATIVE
LIPASE SERPL-CCNC: 50 U/L (ref 73–393)
LYMPHOCYTES # BLD AUTO: 1.58 X10(3) UL (ref 1–4)
LYMPHOCYTES NFR BLD AUTO: 22.1 %
M PROTEIN MFR SERPL ELPH: 7 G/DL (ref 6.4–8.2)
MCH RBC QN AUTO: 27.6 PG (ref 26–34)
MCHC RBC AUTO-ENTMCNC: 32.8 G/DL (ref 31–37)
MCV RBC AUTO: 84 FL (ref 80–100)
MONOCYTES # BLD AUTO: 0.61 X10(3) UL (ref 0.1–1)
MONOCYTES NFR BLD AUTO: 8.5 %
NEUTROPHILS # BLD AUTO: 4.76 X10 (3) UL (ref 1.5–7.7)
NEUTROPHILS # BLD AUTO: 4.76 X10(3) UL (ref 1.5–7.7)
NEUTROPHILS NFR BLD AUTO: 66.5 %
NITRITE UR QL STRIP.AUTO: NEGATIVE
OSMOLALITY SERPL CALC.SUM OF ELEC: 275 MOSM/KG (ref 275–295)
PH UR STRIP.AUTO: 8.5 [PH] (ref 4.5–8)
PLATELET # BLD AUTO: 225 10(3)UL (ref 150–450)
POTASSIUM SERPL-SCNC: 3.7 MMOL/L (ref 3.5–5.1)
PROT UR STRIP.AUTO-MCNC: NEGATIVE MG/DL
RBC # BLD AUTO: 4.57 X10(6)UL (ref 3.8–5.3)
RBC UR QL AUTO: NEGATIVE
SODIUM SERPL-SCNC: 134 MMOL/L (ref 136–145)
SP GR UR STRIP.AUTO: 1.02 (ref 1–1.03)
UROBILINOGEN UR STRIP.AUTO-MCNC: 0.2 MG/DL
WBC # BLD AUTO: 7.2 X10(3) UL (ref 4–11)

## 2020-07-16 PROCEDURE — 99285 EMERGENCY DEPT VISIT HI MDM: CPT

## 2020-07-16 PROCEDURE — 80053 COMPREHEN METABOLIC PANEL: CPT | Performed by: EMERGENCY MEDICINE

## 2020-07-16 PROCEDURE — 96374 THER/PROPH/DIAG INJ IV PUSH: CPT

## 2020-07-16 PROCEDURE — 96361 HYDRATE IV INFUSION ADD-ON: CPT

## 2020-07-16 PROCEDURE — 81003 URINALYSIS AUTO W/O SCOPE: CPT | Performed by: EMERGENCY MEDICINE

## 2020-07-16 PROCEDURE — 99284 EMERGENCY DEPT VISIT MOD MDM: CPT

## 2020-07-16 PROCEDURE — 76857 US EXAM PELVIC LIMITED: CPT | Performed by: EMERGENCY MEDICINE

## 2020-07-16 PROCEDURE — 76700 US EXAM ABDOM COMPLETE: CPT | Performed by: EMERGENCY MEDICINE

## 2020-07-16 PROCEDURE — 96375 TX/PRO/DX INJ NEW DRUG ADDON: CPT

## 2020-07-16 PROCEDURE — 85025 COMPLETE CBC W/AUTO DIFF WBC: CPT | Performed by: EMERGENCY MEDICINE

## 2020-07-16 PROCEDURE — 83690 ASSAY OF LIPASE: CPT | Performed by: EMERGENCY MEDICINE

## 2020-07-16 RX ORDER — MORPHINE SULFATE 4 MG/ML
2 INJECTION, SOLUTION INTRAMUSCULAR; INTRAVENOUS ONCE
Status: COMPLETED | OUTPATIENT
Start: 2020-07-16 | End: 2020-07-16

## 2020-07-16 RX ORDER — MAGNESIUM HYDROXIDE/ALUMINUM HYDROXICE/SIMETHICONE 120; 1200; 1200 MG/30ML; MG/30ML; MG/30ML
30 SUSPENSION ORAL ONCE
Status: COMPLETED | OUTPATIENT
Start: 2020-07-16 | End: 2020-07-16

## 2020-07-16 RX ORDER — ONDANSETRON 2 MG/ML
4 INJECTION INTRAMUSCULAR; INTRAVENOUS ONCE
Status: COMPLETED | OUTPATIENT
Start: 2020-07-16 | End: 2020-07-16

## 2020-07-16 RX ORDER — LIDOCAINE HYDROCHLORIDE 20 MG/ML
10 SOLUTION OROPHARYNGEAL ONCE
Status: COMPLETED | OUTPATIENT
Start: 2020-07-16 | End: 2020-07-16

## 2020-07-16 NOTE — ED INITIAL ASSESSMENT (HPI)
C/o 3 weeks of RUQ pain that is intermittent   Saw PCP - had outpatient labs ordered but decided to go to ED instead  Patient states she is 8 weeks gestation -

## 2020-07-16 NOTE — ED PROVIDER NOTES
Patient Seen in: THE [facility] Emergency Department In [place]      History   Patient presents with:  Abdomen/Flank Pain    Stated Complaint: abd and right flank pain x 3 weeks, vomiting and diarrhea. saw PCP x 3 days.  ha*    HPI    77-year-old female presen above.    Physical Exam     ED Triage Vitals [07/16/20 0852]   /76   Pulse 75   Resp 16   Temp 97.6 °F (36.4 °C)   Temp src Temporal   SpO2 100 %   O2 Device None (Room air)       Current:/71   Pulse 62   Temp 97.6 °F (36.4 °C) (Temporal)   Res DIFFERENTIAL[[id number]]                              Final result                 Please view results for these tests on the individual orders.    CBC W/ DIFFERENTIAL          We will check ultrasound the abdomen rule out cholelithiasis, kidney stones, and days            Medications Prescribed:  Current Discharge Medication List

## 2020-07-16 NOTE — TELEPHONE ENCOUNTER
Pt seen in ER for side pain.  Requesting appt for follow up    Future Appointments   Date Time Provider Noemy Mckinney   7/30/2020  9:30 AM JACOB Nava [facility] [address]

## 2020-07-21 ENCOUNTER — HOSPITAL ENCOUNTER (EMERGENCY)
Age: 20
Discharge: HOME | End: 2020-07-21
Attending: EMERGENCY MEDICINE
Payer: COMMERCIAL

## 2020-07-21 ENCOUNTER — APPOINTMENT (OUTPATIENT)
Dept: ULTRASOUND IMAGING | Age: 20
End: 2020-07-21
Attending: EMERGENCY MEDICINE
Payer: COMMERCIAL

## 2020-07-21 VITALS
HEART RATE: 73 BPM | SYSTOLIC BLOOD PRESSURE: 109 MMHG | WEIGHT: 129 LBS | TEMPERATURE: 98 F | DIASTOLIC BLOOD PRESSURE: 63 MMHG | OXYGEN SATURATION: 98 % | BODY MASS INDEX: 22.02 KG/M2 | RESPIRATION RATE: 18 BRPM | HEIGHT: 64 IN

## 2020-07-21 DIAGNOSIS — O21.0 HYPEREMESIS GRAVIDARUM: Primary | ICD-10-CM

## 2020-07-21 LAB
ALBUMIN SERPL-MCNC: 3.5 G/DL (ref 3.4–5)
ALBUMIN/GLOB SERPL: 0.9 {RATIO} (ref 1–2)
ALP LIVER SERPL-CCNC: 68 U/L (ref 52–144)
ALT SERPL-CCNC: 11 U/L (ref 13–56)
ANION GAP SERPL CALC-SCNC: 3 MMOL/L (ref 0–18)
AST SERPL-CCNC: 7 U/L (ref 15–37)
BASOPHILS # BLD AUTO: 0.03 X10(3) UL (ref 0–0.2)
BASOPHILS NFR BLD AUTO: 0.4 %
BILIRUB SERPL-MCNC: 0.3 MG/DL (ref 0.1–2)
BILIRUB UR QL STRIP.AUTO: NEGATIVE
BUN BLD-MCNC: 10 MG/DL (ref 7–18)
BUN/CREAT SERPL: 15.9 (ref 10–20)
CALCIUM BLD-MCNC: 8.4 MG/DL (ref 8.5–10.1)
CHLORIDE SERPL-SCNC: 109 MMOL/L (ref 98–112)
CO2 SERPL-SCNC: 24 MMOL/L (ref 21–32)
COLOR UR AUTO: YELLOW
CREAT BLD-MCNC: 0.63 MG/DL (ref 0.55–1.02)
DEPRECATED RDW RBC AUTO: 42 FL (ref 35.1–46.3)
EOSINOPHIL # BLD AUTO: 0.06 X10(3) UL (ref 0–0.7)
EOSINOPHIL NFR BLD AUTO: 0.7 %
ERYTHROCYTE [DISTWIDTH] IN BLOOD BY AUTOMATED COUNT: 13.8 % (ref 11–15)
GLOBULIN PLAS-MCNC: 3.8 G/DL (ref 2.8–4.4)
GLUCOSE BLD-MCNC: 91 MG/DL (ref 70–99)
GLUCOSE UR STRIP.AUTO-MCNC: NEGATIVE MG/DL
HCT VFR BLD AUTO: 36.7 % (ref 35–48)
HGB BLD-MCNC: 12 G/DL (ref 12–16)
IMM GRANULOCYTES # BLD AUTO: 0.04 X10(3) UL (ref 0–1)
IMM GRANULOCYTES NFR BLD: 0.5 %
KETONES UR STRIP.AUTO-MCNC: NEGATIVE MG/DL
LEUKOCYTE ESTERASE UR QL STRIP.AUTO: NEGATIVE
LIPASE SERPL-CCNC: 65 U/L (ref 73–393)
LYMPHOCYTES # BLD AUTO: 1.83 X10(3) UL (ref 1–4)
LYMPHOCYTES NFR BLD AUTO: 21.9 %
M PROTEIN MFR SERPL ELPH: 7.3 G/DL (ref 6.4–8.2)
MCH RBC QN AUTO: 27.1 PG (ref 26–34)
MCHC RBC AUTO-ENTMCNC: 32.7 G/DL (ref 31–37)
MCV RBC AUTO: 82.8 FL (ref 80–100)
MONOCYTES # BLD AUTO: 0.4 X10(3) UL (ref 0.1–1)
MONOCYTES NFR BLD AUTO: 4.8 %
NEUTROPHILS # BLD AUTO: 6.01 X10 (3) UL (ref 1.5–7.7)
NEUTROPHILS # BLD AUTO: 6.01 X10(3) UL (ref 1.5–7.7)
NEUTROPHILS NFR BLD AUTO: 71.7 %
NITRITE UR QL STRIP.AUTO: NEGATIVE
OSMOLALITY SERPL CALC.SUM OF ELEC: 281 MOSM/KG (ref 275–295)
PH UR STRIP.AUTO: 8.5 [PH] (ref 4.5–8)
PLATELET # BLD AUTO: 239 10(3)UL (ref 150–450)
POTASSIUM SERPL-SCNC: 3.5 MMOL/L (ref 3.5–5.1)
RBC # BLD AUTO: 4.43 X10(6)UL (ref 3.8–5.3)
RBC UR QL AUTO: NEGATIVE
SODIUM SERPL-SCNC: 136 MMOL/L (ref 136–145)
SP GR UR STRIP.AUTO: 1.02 (ref 1–1.03)
UROBILINOGEN UR STRIP.AUTO-MCNC: 0.2 MG/DL
WBC # BLD AUTO: 8.4 X10(3) UL (ref 4–11)

## 2020-07-21 PROCEDURE — 76801 OB US < 14 WKS SINGLE FETUS: CPT | Performed by: EMERGENCY MEDICINE

## 2020-07-21 PROCEDURE — 85025 COMPLETE CBC W/AUTO DIFF WBC: CPT | Performed by: EMERGENCY MEDICINE

## 2020-07-21 PROCEDURE — 96361 HYDRATE IV INFUSION ADD-ON: CPT

## 2020-07-21 PROCEDURE — 99284 EMERGENCY DEPT VISIT MOD MDM: CPT

## 2020-07-21 PROCEDURE — 81003 URINALYSIS AUTO W/O SCOPE: CPT | Performed by: EMERGENCY MEDICINE

## 2020-07-21 PROCEDURE — 80053 COMPREHEN METABOLIC PANEL: CPT | Performed by: EMERGENCY MEDICINE

## 2020-07-21 PROCEDURE — 96360 HYDRATION IV INFUSION INIT: CPT

## 2020-07-21 PROCEDURE — 83690 ASSAY OF LIPASE: CPT | Performed by: EMERGENCY MEDICINE

## 2020-07-21 RX ORDER — ONDANSETRON 4 MG/1
4 TABLET, ORALLY DISINTEGRATING ORAL EVERY 4 HOURS PRN
Qty: 20 TABLET | Refills: 0 | Status: SHIPPED | OUTPATIENT
Start: 2020-07-21 | End: 2021-02-23 | Stop reason: ALTCHOICE

## 2020-07-21 NOTE — ED INITIAL ASSESSMENT (HPI)
C/o right sided abd pain x 1 week. Vomiting and diarrhea. 8 weeks pregnant.  Was seen here last week and had an U/S.

## 2020-07-21 NOTE — ED PROVIDER NOTES
Patient Seen in: THE [facility] Emergency Department In [place]      History   Patient presents with:  Nausea/Vomiting/Diarrhea    Stated Complaint: abdomen and vomiting was seen here a week ago for the same thing     HPI    43-year-old female presents report 05/16/2020 (Exact Date)   SpO2 98%   BMI 22.14 kg/m²         Physical Exam    General:  Vitals as listed. No acute distress   HEENT: Sclerae anicteric. Conjunctivae show no pallor.   Oropharynx clear, mucous membranes moist   Neck: supple, no rigidity   L hand.  No significant joint space narrowing, hypertrophic change or erosions.    Dictated by: Armen James MD on 7/11/2020 at 4:26 PM     Finalized by: Armen James MD on 7/11/2020 at 4:27 PM          Us Abdomen Complete (cpt=76700)    Result Date: 7/16 Maryann Kaufman MD on 7/16/2020 at 10:48 AM          Us Bladder Only (CUG=[id number])    Result Date: 7/16/2020  PROCEDURE:  US BLADDER ONLY ([id number]=[id number])  COMPARISON:  None. INDICATIONS:  abd and right flank pain x 3 weeks, vomiting and diarrhea.  saw PCP x 3 CUL-DE-SAC:  Normal. CLINICAL AGE:  9 weeks 3 days SONOGRAPHIC AGE:  9 weeks 0 days +/-4 days. OTHER:  Negative. CONCLUSION:  1. There is a single intrauterine pregnancy with a mean age of 9 weeks 0 days +/-4 days. There is normal fetal cardiac activity. PM    START taking these medications    !! ondansetron 4 MG Oral Tablet Dispersible  Take 1 tablet (4 mg total) by mouth every 4 (four) hours as needed for Nausea., Normal, Disp-20 tablet, R-0    !! - Potential duplicate medications found.  Please discuss w

## 2020-07-23 ENCOUNTER — TELEPHONE (OUTPATIENT)
Dept: FAMILY MEDICINE CLINIC | Facility: CLINIC | Age: 20
End: 2020-07-23

## 2020-07-30 ENCOUNTER — TELEPHONE (OUTPATIENT)
Dept: OBGYN CLINIC | Facility: CLINIC | Age: 20
End: 2020-07-30

## 2020-07-30 NOTE — TELEPHONE ENCOUNTER
Pt was no show for her appt today with Radha Becker for new ob in [place].  Tried to call  X3 times and left 3 messages to call office back if wants to R/S

## 2020-09-16 ENCOUNTER — OFFICE VISIT (OUTPATIENT)
Dept: FAMILY MEDICINE CLINIC | Facility: CLINIC | Age: 20
End: 2020-09-16
Payer: COMMERCIAL

## 2020-09-16 VITALS
WEIGHT: 136 LBS | TEMPERATURE: 97 F | HEIGHT: 64 IN | BODY MASS INDEX: 23.22 KG/M2 | SYSTOLIC BLOOD PRESSURE: 116 MMHG | RESPIRATION RATE: 16 BRPM | DIASTOLIC BLOOD PRESSURE: 56 MMHG | HEART RATE: 78 BPM | OXYGEN SATURATION: 99 %

## 2020-09-16 DIAGNOSIS — H65.91 MIDDLE EAR EFFUSION, RIGHT: Primary | ICD-10-CM

## 2020-09-16 PROCEDURE — 3074F SYST BP LT 130 MM HG: CPT | Performed by: NURSE PRACTITIONER

## 2020-09-16 PROCEDURE — 3008F BODY MASS INDEX DOCD: CPT | Performed by: NURSE PRACTITIONER

## 2020-09-16 PROCEDURE — 99213 OFFICE O/P EST LOW 20 MIN: CPT | Performed by: NURSE PRACTITIONER

## 2020-09-16 PROCEDURE — 3078F DIAST BP <80 MM HG: CPT | Performed by: NURSE PRACTITIONER

## 2020-09-16 NOTE — PROGRESS NOTES
CHIEF COMPLAINT:   No chief complaint on file. HPI:   Marina Kim is a 21year old female who presents to clinic today with complaints of right ear pain. Has had for 2  days. Pain is described as stabbing.   Patient reports history of ear i Smokeless tobacco: Never Used    Alcohol use: No    Drug use: Not Currently      Types: Cannabis       REVIEW OF SYSTEMS:   GENERAL: Feeling well otherwise.     SKIN: no unusual skin lesions or rashes  HEENT: See HPI  LUNGS: No cough, shortness of breat Earaches can happen without an infection. They can occur when air and fluid build up behind the eardrum. They may cause a feeling of fullness and discomfort. They may also impair hearing.  This is called otitis media with effusion (OME) or serous otitis med · You may use over-the-counter medicine as directed by your healthcare provider to control pain, unless medicine was prescribed.  If you have chronic liver or kidney disease or ever had a stomach ulcer or GI bleeding, talk with your healthcare provider befo

## 2020-10-28 ENCOUNTER — VIRTUAL PHONE E/M (OUTPATIENT)
Dept: FAMILY MEDICINE CLINIC | Facility: CLINIC | Age: 20
End: 2020-10-28
Payer: COMMERCIAL

## 2020-10-28 DIAGNOSIS — Z3A.23 23 WEEKS GESTATION OF PREGNANCY: ICD-10-CM

## 2020-10-28 DIAGNOSIS — R05.9 COUGH: ICD-10-CM

## 2020-10-28 DIAGNOSIS — Z20.822 EXPOSURE TO COVID-19 VIRUS: ICD-10-CM

## 2020-10-28 DIAGNOSIS — J45.901 MODERATE ASTHMA WITH EXACERBATION, UNSPECIFIED WHETHER PERSISTENT: ICD-10-CM

## 2020-10-28 DIAGNOSIS — Z20.822 SUSPECTED COVID-19 VIRUS INFECTION: Primary | ICD-10-CM

## 2020-10-28 PROCEDURE — 99213 OFFICE O/P EST LOW 20 MIN: CPT | Performed by: PHYSICIAN ASSISTANT

## 2020-10-28 RX ORDER — ALBUTEROL SULFATE 2.5 MG/3ML
2.5 SOLUTION RESPIRATORY (INHALATION) EVERY 4 HOURS PRN
Qty: 1 BOX | Refills: 1 | Status: SHIPPED | OUTPATIENT
Start: 2020-10-28

## 2020-10-28 RX ORDER — IPRATROPIUM BROMIDE AND ALBUTEROL SULFATE 2.5; .5 MG/3ML; MG/3ML
3 SOLUTION RESPIRATORY (INHALATION) EVERY 6 HOURS PRN
Qty: 120 ML | Refills: 1 | Status: SHIPPED | OUTPATIENT
Start: 2020-10-28 | End: 2021-05-28

## 2020-10-28 NOTE — PROGRESS NOTES
Virtual Telephone Check-In    Delvis Andrea verbally consents to a Virtual/Telephone Check-In visit on 10/28/20. Patient has been referred to the [facility] website at [email or web address] to review the yearly Consent to Treat document.     Patient u

## 2020-10-29 ENCOUNTER — APPOINTMENT (OUTPATIENT)
Dept: LAB | Age: 20
End: 2020-10-29
Attending: PHYSICIAN ASSISTANT
Payer: COMMERCIAL

## 2020-10-29 DIAGNOSIS — Z20.822 EXPOSURE TO COVID-19 VIRUS: ICD-10-CM

## 2020-10-31 ENCOUNTER — TELEPHONE (OUTPATIENT)
Dept: FAMILY MEDICINE CLINIC | Facility: CLINIC | Age: 20
End: 2020-10-31

## 2020-10-31 NOTE — TELEPHONE ENCOUNTER
Results are not back yet called and talked to patient and told her to expect results tomorrow in her my chart, She wanted cough med but she is 20 weeks pregnant so I told there to contact her OB to see what they prefer

## 2020-10-31 NOTE — TELEPHONE ENCOUNTER
Patient called checking if COVID results are in, if not wants to know if can get something called in to help with her cough?

## 2020-11-02 ENCOUNTER — VIRTUAL PHONE E/M (OUTPATIENT)
Dept: FAMILY MEDICINE CLINIC | Facility: CLINIC | Age: 20
End: 2020-11-02
Payer: COMMERCIAL

## 2020-11-02 DIAGNOSIS — Z20.822 SUSPECTED COVID-19 VIRUS INFECTION: Primary | ICD-10-CM

## 2020-11-02 DIAGNOSIS — Z3A.23 23 WEEKS GESTATION OF PREGNANCY: ICD-10-CM

## 2020-11-02 DIAGNOSIS — J45.901 MODERATE ASTHMA WITH EXACERBATION, UNSPECIFIED WHETHER PERSISTENT: ICD-10-CM

## 2020-11-02 PROCEDURE — 99213 OFFICE O/P EST LOW 20 MIN: CPT | Performed by: FAMILY MEDICINE

## 2020-11-02 RX ORDER — PREDNISONE 20 MG/1
20 TABLET ORAL DAILY
Qty: 5 TABLET | Refills: 0 | Status: SHIPPED | OUTPATIENT
Start: 2020-11-02 | End: 2020-11-07

## 2020-11-02 NOTE — PROGRESS NOTES
Virtual Telephone Check-In    Guillermo Ugarte verbally consents to a Virtual/Telephone Check-In visit on 11/02/20. Patient has been referred to the [facility] website at [email or web address] to review the yearly Consent to Treat document.     Patient u

## 2020-11-07 ENCOUNTER — HOSPITAL ENCOUNTER (OUTPATIENT)
Age: 20
Discharge: HOME | End: 2020-11-07
Payer: COMMERCIAL

## 2020-11-07 VITALS
SYSTOLIC BLOOD PRESSURE: 101 MMHG | RESPIRATION RATE: 20 BRPM | TEMPERATURE: 98 F | DIASTOLIC BLOOD PRESSURE: 73 MMHG | HEART RATE: 100 BPM | OXYGEN SATURATION: 98 %

## 2020-11-07 DIAGNOSIS — J22 LOWER RESPIRATORY INFECTION: Primary | ICD-10-CM

## 2020-11-07 PROCEDURE — 99214 OFFICE O/P EST MOD 30 MIN: CPT | Performed by: NURSE PRACTITIONER

## 2020-11-07 RX ORDER — AZITHROMYCIN 250 MG/1
TABLET, FILM COATED ORAL
Qty: 1 PACKAGE | Refills: 0 | Status: SHIPPED | OUTPATIENT
Start: 2020-11-07 | End: 2020-11-12

## 2020-11-07 RX ORDER — PREDNISONE 10 MG/1
TABLET ORAL
Qty: 20 TABLET | Refills: 0 | Status: SHIPPED | OUTPATIENT
Start: 2020-11-07 | End: 2020-11-15

## 2020-11-07 NOTE — ED PROVIDER NOTES
Patient Seen in: [facility]      History   Patient presents with:  Cough  Wheezing    Stated Complaint: cough, wheezing    59-year-old female presents today with ongoing cough and wheezing for the last 3 weeks.   Patient was on 5 days of predni ED Triage Vitals [11/07/20 0821]   /73   Pulse 100   Resp 20   Temp 98 °F (36.7 °C)   Temp src Temporal   SpO2 98 %   O2 Device None (Room air)       Current:/73   Pulse 100   Temp 98 °F (36.7 °C) (Temporal)   Resp 20   LMP 05/16/2020 ([facility] care.                   Disposition and Plan     Clinical Impression:  Lower respiratory infection  (primary encounter diagnosis)    Disposition:  Discharge  11/7/2020  8:49 am    Follow-up:  Abby Vences DO  [address] [place] [address]

## 2020-11-07 NOTE — ED INITIAL ASSESSMENT (HPI)
Patient c/o cough for 2-3 weeks. Was exposed to Covid when cough started and had a Covid test here at that time which was negative. PCP gave her Prednisone that she finished.  Was seen by her OB and was instructed to follow up here for no relief of cough an

## 2021-02-03 ENCOUNTER — TELEPHONE (OUTPATIENT)
Dept: FAMILY MEDICINE CLINIC | Facility: CLINIC | Age: 21
End: 2021-02-03

## 2021-02-03 NOTE — TELEPHONE ENCOUNTER
Hipolito Bateman with DCFS would like to speak with you regarding patient, needs some information on wether you believe patient has any issues with substance abuse. Patient tested positive for opioids and THC while in hospital having baby, THC was detected in urine in baby.

## 2021-02-23 ENCOUNTER — OFFICE VISIT (OUTPATIENT)
Dept: FAMILY MEDICINE CLINIC | Facility: CLINIC | Age: 21
End: 2021-02-23
Payer: COMMERCIAL

## 2021-02-23 ENCOUNTER — TELEPHONE (OUTPATIENT)
Dept: FAMILY MEDICINE CLINIC | Facility: CLINIC | Age: 21
End: 2021-02-23

## 2021-02-23 VITALS
TEMPERATURE: 98 F | HEART RATE: 74 BPM | HEIGHT: 64 IN | DIASTOLIC BLOOD PRESSURE: 60 MMHG | SYSTOLIC BLOOD PRESSURE: 110 MMHG | WEIGHT: 130 LBS | OXYGEN SATURATION: 100 % | BODY MASS INDEX: 22.2 KG/M2 | RESPIRATION RATE: 14 BRPM

## 2021-02-23 DIAGNOSIS — F41.8 POSTPARTUM ANXIETY: ICD-10-CM

## 2021-02-23 DIAGNOSIS — K82.9 GALLBLADDER ATTACK: Primary | ICD-10-CM

## 2021-02-23 PROBLEM — K82.4 GALLBLADDER POLYP: Status: ACTIVE | Noted: 2020-12-08

## 2021-02-23 PROBLEM — K80.50 BILIARY COLIC: Status: ACTIVE | Noted: 2020-12-08

## 2021-02-23 PROCEDURE — 3074F SYST BP LT 130 MM HG: CPT | Performed by: FAMILY MEDICINE

## 2021-02-23 PROCEDURE — 3078F DIAST BP <80 MM HG: CPT | Performed by: FAMILY MEDICINE

## 2021-02-23 PROCEDURE — 99214 OFFICE O/P EST MOD 30 MIN: CPT | Performed by: FAMILY MEDICINE

## 2021-02-23 PROCEDURE — 3008F BODY MASS INDEX DOCD: CPT | Performed by: FAMILY MEDICINE

## 2021-02-23 RX ORDER — NAPROXEN 500 MG/1
500 TABLET ORAL 2 TIMES DAILY WITH MEALS
Qty: 60 TABLET | Refills: 0 | Status: SHIPPED | OUTPATIENT
Start: 2021-02-23

## 2021-02-23 RX ORDER — ESCITALOPRAM OXALATE 10 MG/1
10 TABLET ORAL DAILY
Qty: 30 TABLET | Refills: 1 | Status: SHIPPED | OUTPATIENT
Start: 2021-02-23 | End: 2021-05-03

## 2021-02-23 RX ORDER — ONDANSETRON 4 MG/1
4 TABLET, ORALLY DISINTEGRATING ORAL EVERY 4 HOURS PRN
Qty: 20 TABLET | Refills: 0 | Status: SHIPPED | OUTPATIENT
Start: 2021-02-23 | End: 2021-03-31

## 2021-02-23 RX ORDER — HYDROCODONE BITARTRATE AND ACETAMINOPHEN 5; 325 MG/1; MG/1
1 TABLET ORAL EVERY 8 HOURS PRN
Qty: 20 TABLET | Refills: 0 | Status: SHIPPED | OUTPATIENT
Start: 2021-02-23 | End: 2021-03-31

## 2021-02-23 NOTE — PROGRESS NOTES
8:42  Chief Complaint:  Patient presents with:  Depression: Follow Up Post Pardum Depression   Gallbladder: Has an appt with Surgeon in 3 weeks       HPI:  This is a 24year old female patient presenting for Depression (Follow Up Post Pardum Depression ) a reviewed and listed as below.     HISTORY:  Past Medical History:   Diagnosis Date   • Acetaminophen overdose    • Anxiety    • Asthma    • Depression    • Extrinsic asthma, unspecified    • Migraines    • Psychiatric disorder    • PTSD (post-traumatic stre (six) hours as needed for Wheezing (cough). 1 Inhaler 0   • Prenatal MV-Min-Fe Fum-FA-DHA (PRENATAL 1 OR) Take by mouth. • Ipratropium-Albuterol  MCG/ACT Inhalation Aero Soln Inhale 2 puffs into the lungs every 4 to 6 hours.  1 Inhaler 1   • aceta appropriate use, side effect for herself and infant, to pump and dump if able. -    Otherwise, to use naproxen 500 MG Oral Tab; Take 1 tablet (500 mg total) by mouth 2 (two) times daily with meals.     Postpartum anxiety  -    Will start escitalopram 10 MG

## 2021-02-23 NOTE — TELEPHONE ENCOUNTER
Breezy called to state that with the Naproxen and Escitalopram there is an increased risk of upper GI issues, wants to make sure Dr. Briseida Martinez is aware and still wants medication dispensed.  Please advise

## 2021-02-23 NOTE — TELEPHONE ENCOUNTER
Called and talked to pharmacist told her we are aware of this but will follow back up with the patient and have her make sure she eats food when taking these meds.  Called and LMOM in detail to take naproxen with a snack to prevent gastric upset

## 2021-03-18 ENCOUNTER — APPOINTMENT (OUTPATIENT)
Dept: ULTRASOUND IMAGING | Age: 21
End: 2021-03-18
Attending: EMERGENCY MEDICINE
Payer: COMMERCIAL

## 2021-03-18 ENCOUNTER — HOSPITAL ENCOUNTER (EMERGENCY)
Age: 21
Discharge: HOME | End: 2021-03-18
Attending: EMERGENCY MEDICINE
Payer: COMMERCIAL

## 2021-03-18 VITALS
BODY MASS INDEX: 23 KG/M2 | TEMPERATURE: 97 F | SYSTOLIC BLOOD PRESSURE: 110 MMHG | OXYGEN SATURATION: 99 % | RESPIRATION RATE: 18 BRPM | HEART RATE: 64 BPM | DIASTOLIC BLOOD PRESSURE: 63 MMHG | WEIGHT: 132.25 LBS

## 2021-03-18 DIAGNOSIS — K82.4 POLYP OF GALLBLADDER: Primary | ICD-10-CM

## 2021-03-18 DIAGNOSIS — R10.9 ABDOMINAL PAIN, ACUTE: ICD-10-CM

## 2021-03-18 LAB
ALBUMIN SERPL-MCNC: 4 G/DL (ref 3.4–5)
ALBUMIN/GLOB SERPL: 1.3 {RATIO} (ref 1–2)
ALP LIVER SERPL-CCNC: 65 U/L
ALT SERPL-CCNC: 12 U/L
ANION GAP SERPL CALC-SCNC: 5 MMOL/L (ref 0–18)
AST SERPL-CCNC: 12 U/L (ref 15–37)
BASOPHILS # BLD AUTO: 0.06 X10(3) UL (ref 0–0.2)
BASOPHILS NFR BLD AUTO: 0.9 %
BILIRUB SERPL-MCNC: 0.4 MG/DL (ref 0.1–2)
BILIRUB UR QL CFM: NEGATIVE
BUN BLD-MCNC: 13 MG/DL (ref 7–18)
BUN/CREAT SERPL: 14.4 (ref 10–20)
CALCIUM BLD-MCNC: 9.3 MG/DL (ref 8.5–10.1)
CHLORIDE SERPL-SCNC: 109 MMOL/L (ref 98–112)
CLARITY UR REFRACT.AUTO: CLEAR
CO2 SERPL-SCNC: 25 MMOL/L (ref 21–32)
COLOR UR AUTO: YELLOW
CREAT BLD-MCNC: 0.9 MG/DL
DEPRECATED RDW RBC AUTO: 55.2 FL (ref 35.1–46.3)
EOSINOPHIL # BLD AUTO: 0.21 X10(3) UL (ref 0–0.7)
EOSINOPHIL NFR BLD AUTO: 3 %
ERYTHROCYTE [DISTWIDTH] IN BLOOD BY AUTOMATED COUNT: 18.1 % (ref 11–15)
GLOBULIN PLAS-MCNC: 3 G/DL (ref 2.8–4.4)
GLUCOSE BLD-MCNC: 88 MG/DL (ref 70–99)
GLUCOSE UR STRIP.AUTO-MCNC: NEGATIVE MG/DL
HCT VFR BLD AUTO: 40.9 %
HGB BLD-MCNC: 12.7 G/DL
IMM GRANULOCYTES # BLD AUTO: 0.02 X10(3) UL (ref 0–1)
IMM GRANULOCYTES NFR BLD: 0.3 %
LIPASE SERPL-CCNC: 67 U/L (ref 73–393)
LYMPHOCYTES # BLD AUTO: 2.54 X10(3) UL (ref 1–4)
LYMPHOCYTES NFR BLD AUTO: 36.8 %
M PROTEIN MFR SERPL ELPH: 7 G/DL (ref 6.4–8.2)
MCH RBC QN AUTO: 25.9 PG (ref 26–34)
MCHC RBC AUTO-ENTMCNC: 31.1 G/DL (ref 31–37)
MCV RBC AUTO: 83.5 FL
MONOCYTES # BLD AUTO: 0.41 X10(3) UL (ref 0.1–1)
MONOCYTES NFR BLD AUTO: 5.9 %
NEUTROPHILS # BLD AUTO: 3.66 X10 (3) UL (ref 1.5–7.7)
NEUTROPHILS # BLD AUTO: 3.66 X10(3) UL (ref 1.5–7.7)
NEUTROPHILS NFR BLD AUTO: 53.1 %
NITRITE UR QL STRIP.AUTO: NEGATIVE
OSMOLALITY SERPL CALC.SUM OF ELEC: 288 MOSM/KG (ref 275–295)
PH UR STRIP.AUTO: 6 [PH] (ref 5–8)
PLATELET # BLD AUTO: 227 10(3)UL (ref 150–450)
POCT LOT NUMBER: NORMAL
POCT URINE PREGNANCY: NEGATIVE
POTASSIUM SERPL-SCNC: 4 MMOL/L (ref 3.5–5.1)
PROT UR STRIP.AUTO-MCNC: NEGATIVE MG/DL
RBC # BLD AUTO: 4.9 X10(6)UL
RBC UR QL AUTO: NEGATIVE
SODIUM SERPL-SCNC: 139 MMOL/L (ref 136–145)
SP GR UR STRIP.AUTO: 1.02 (ref 1–1.03)
UROBILINOGEN UR STRIP.AUTO-MCNC: 1 MG/DL
WBC # BLD AUTO: 6.9 X10(3) UL (ref 4–11)

## 2021-03-18 PROCEDURE — 81001 URINALYSIS AUTO W/SCOPE: CPT | Performed by: EMERGENCY MEDICINE

## 2021-03-18 PROCEDURE — 99284 EMERGENCY DEPT VISIT MOD MDM: CPT | Performed by: NURSE PRACTITIONER

## 2021-03-18 PROCEDURE — 81025 URINE PREGNANCY TEST: CPT | Performed by: NURSE PRACTITIONER

## 2021-03-18 PROCEDURE — 87186 SC STD MICRODIL/AGAR DIL: CPT | Performed by: EMERGENCY MEDICINE

## 2021-03-18 PROCEDURE — 87086 URINE CULTURE/COLONY COUNT: CPT | Performed by: EMERGENCY MEDICINE

## 2021-03-18 PROCEDURE — 87088 URINE BACTERIA CULTURE: CPT | Performed by: EMERGENCY MEDICINE

## 2021-03-18 PROCEDURE — 81015 MICROSCOPIC EXAM OF URINE: CPT | Performed by: EMERGENCY MEDICINE

## 2021-03-18 PROCEDURE — 85025 COMPLETE CBC W/AUTO DIFF WBC: CPT | Performed by: EMERGENCY MEDICINE

## 2021-03-18 PROCEDURE — 83690 ASSAY OF LIPASE: CPT | Performed by: EMERGENCY MEDICINE

## 2021-03-18 PROCEDURE — 76700 US EXAM ABDOM COMPLETE: CPT | Performed by: EMERGENCY MEDICINE

## 2021-03-18 PROCEDURE — 96374 THER/PROPH/DIAG INJ IV PUSH: CPT | Performed by: NURSE PRACTITIONER

## 2021-03-18 PROCEDURE — 80053 COMPREHEN METABOLIC PANEL: CPT | Performed by: EMERGENCY MEDICINE

## 2021-03-18 PROCEDURE — 96375 TX/PRO/DX INJ NEW DRUG ADDON: CPT | Performed by: NURSE PRACTITIONER

## 2021-03-18 RX ORDER — HYDROMORPHONE HYDROCHLORIDE 1 MG/ML
0.5 INJECTION, SOLUTION INTRAMUSCULAR; INTRAVENOUS; SUBCUTANEOUS ONCE
Status: COMPLETED | OUTPATIENT
Start: 2021-03-18 | End: 2021-03-18

## 2021-03-18 RX ORDER — ONDANSETRON 4 MG/1
4 TABLET, ORALLY DISINTEGRATING ORAL EVERY 4 HOURS PRN
Qty: 20 TABLET | Refills: 0 | Status: SHIPPED | OUTPATIENT
Start: 2021-03-18 | End: 2021-03-25

## 2021-03-18 RX ORDER — MORPHINE SULFATE 4 MG/ML
4 INJECTION, SOLUTION INTRAMUSCULAR; INTRAVENOUS ONCE
Status: COMPLETED | OUTPATIENT
Start: 2021-03-18 | End: 2021-03-18

## 2021-03-18 RX ORDER — HYDROCODONE BITARTRATE AND ACETAMINOPHEN 10; 325 MG/1; MG/1
1-2 TABLET ORAL EVERY 6 HOURS PRN
Qty: 15 TABLET | Refills: 0 | Status: SHIPPED | OUTPATIENT
Start: 2021-03-18 | End: 2021-03-25

## 2021-03-18 RX ORDER — ONDANSETRON 2 MG/ML
4 INJECTION INTRAMUSCULAR; INTRAVENOUS ONCE
Status: COMPLETED | OUTPATIENT
Start: 2021-03-18 | End: 2021-03-18

## 2021-03-18 NOTE — ED INITIAL ASSESSMENT (HPI)
\" I have a gallbladder attack\" has apt with surgeon at [facility] on march 30th. C/o nausea and vomiting. Denies any fevers.

## 2021-03-18 NOTE — ED PROVIDER NOTES
Patient Seen in: [facility] In [place]      History   Patient presents with:  Abdomen/Flank Pain    Stated Complaint: abd pain r sided    HPI/Subjective:   49-year-old female presents to emergency department with right upper quadrant Positive for stated complaint: abd pain r sided  Other systems are as noted in HPI. Constitutional and vital signs reviewed. All other systems reviewed and negative except as noted above.     Physical Exam     ED Triage Vitals [03/18/21 1107] following components:    WBC Urine 6-10 (*)     RBC Urine 3-5 (*)     Bacteria Urine 2+ (*)     Squamous Epi.  Cells Moderate (*)     All other components within normal limits   CBC W/ DIFFERENTIAL - Abnormal; Notable for the following components:    MCH 25 MDM    44-year-old female presents to emergency department with history of gallbladder polyps and acute abdominal pain. Patient was given 4 mg of morphine along with 1 L of normal saline.   Patient continued to have pain after morphine  Patien Prescribed:  Discharge Medication List as of 3/18/2021  1:26 PM    START taking these medications    HYDROcodone-acetaminophen  MG Oral Tab  Take 1-2 tablets by mouth every 6 (six) hours as needed for Pain., Normal, Disp-15 tablet, R-0

## 2021-03-20 RX ORDER — CEPHALEXIN 500 MG/1
500 CAPSULE ORAL 2 TIMES DAILY
Qty: 14 CAPSULE | Refills: 0 | Status: SHIPPED | OUTPATIENT
Start: 2021-03-20 | End: 2021-03-26

## 2021-03-22 ENCOUNTER — HOSPITAL ENCOUNTER (EMERGENCY)
Age: 21
Discharge: HOME | End: 2021-03-22
Attending: EMERGENCY MEDICINE
Payer: COMMERCIAL

## 2021-03-22 VITALS
OXYGEN SATURATION: 99 % | RESPIRATION RATE: 16 BRPM | HEIGHT: 64 IN | BODY MASS INDEX: 23.22 KG/M2 | HEART RATE: 65 BPM | SYSTOLIC BLOOD PRESSURE: 117 MMHG | TEMPERATURE: 97 F | DIASTOLIC BLOOD PRESSURE: 75 MMHG | WEIGHT: 136 LBS

## 2021-03-22 DIAGNOSIS — K82.8 BILIARY DYSKINESIA: Primary | ICD-10-CM

## 2021-03-22 LAB
ALBUMIN SERPL-MCNC: 4 G/DL (ref 3.4–5)
ALBUMIN/GLOB SERPL: 1.3 {RATIO} (ref 1–2)
ALP LIVER SERPL-CCNC: 70 U/L
ALT SERPL-CCNC: 12 U/L
ANION GAP SERPL CALC-SCNC: 5 MMOL/L (ref 0–18)
AST SERPL-CCNC: 12 U/L (ref 15–37)
BASOPHILS # BLD AUTO: 0.03 X10(3) UL (ref 0–0.2)
BASOPHILS NFR BLD AUTO: 0.5 %
BILIRUB SERPL-MCNC: 0.3 MG/DL (ref 0.1–2)
BUN BLD-MCNC: 15 MG/DL (ref 7–18)
BUN/CREAT SERPL: 17.2 (ref 10–20)
CALCIUM BLD-MCNC: 9.1 MG/DL (ref 8.5–10.1)
CHLORIDE SERPL-SCNC: 110 MMOL/L (ref 98–112)
CO2 SERPL-SCNC: 24 MMOL/L (ref 21–32)
CREAT BLD-MCNC: 0.87 MG/DL
DEPRECATED RDW RBC AUTO: 55.2 FL (ref 35.1–46.3)
EOSINOPHIL # BLD AUTO: 0.2 X10(3) UL (ref 0–0.7)
EOSINOPHIL NFR BLD AUTO: 3.2 %
ERYTHROCYTE [DISTWIDTH] IN BLOOD BY AUTOMATED COUNT: 18 % (ref 11–15)
GLOBULIN PLAS-MCNC: 3.2 G/DL (ref 2.8–4.4)
GLUCOSE BLD-MCNC: 84 MG/DL (ref 70–99)
HCT VFR BLD AUTO: 39.7 %
HGB BLD-MCNC: 12.6 G/DL
IMM GRANULOCYTES # BLD AUTO: 0.01 X10(3) UL (ref 0–1)
IMM GRANULOCYTES NFR BLD: 0.2 %
LIPASE SERPL-CCNC: 66 U/L (ref 73–393)
LYMPHOCYTES # BLD AUTO: 2.22 X10(3) UL (ref 1–4)
LYMPHOCYTES NFR BLD AUTO: 35.2 %
M PROTEIN MFR SERPL ELPH: 7.2 G/DL (ref 6.4–8.2)
MCH RBC QN AUTO: 26.5 PG (ref 26–34)
MCHC RBC AUTO-ENTMCNC: 31.7 G/DL (ref 31–37)
MCV RBC AUTO: 83.6 FL
MONOCYTES # BLD AUTO: 0.4 X10(3) UL (ref 0.1–1)
MONOCYTES NFR BLD AUTO: 6.3 %
NEUTROPHILS # BLD AUTO: 3.44 X10 (3) UL (ref 1.5–7.7)
NEUTROPHILS # BLD AUTO: 3.44 X10(3) UL (ref 1.5–7.7)
NEUTROPHILS NFR BLD AUTO: 54.6 %
OSMOLALITY SERPL CALC.SUM OF ELEC: 288 MOSM/KG (ref 275–295)
PLATELET # BLD AUTO: 238 10(3)UL (ref 150–450)
POTASSIUM SERPL-SCNC: 4 MMOL/L (ref 3.5–5.1)
RBC # BLD AUTO: 4.75 X10(6)UL
SODIUM SERPL-SCNC: 139 MMOL/L (ref 136–145)
WBC # BLD AUTO: 6.3 X10(3) UL (ref 4–11)

## 2021-03-22 PROCEDURE — 99284 EMERGENCY DEPT VISIT MOD MDM: CPT

## 2021-03-22 PROCEDURE — 96375 TX/PRO/DX INJ NEW DRUG ADDON: CPT

## 2021-03-22 PROCEDURE — 96374 THER/PROPH/DIAG INJ IV PUSH: CPT

## 2021-03-22 PROCEDURE — 96376 TX/PRO/DX INJ SAME DRUG ADON: CPT

## 2021-03-22 PROCEDURE — 85025 COMPLETE CBC W/AUTO DIFF WBC: CPT | Performed by: EMERGENCY MEDICINE

## 2021-03-22 PROCEDURE — 80053 COMPREHEN METABOLIC PANEL: CPT | Performed by: EMERGENCY MEDICINE

## 2021-03-22 PROCEDURE — 83690 ASSAY OF LIPASE: CPT | Performed by: EMERGENCY MEDICINE

## 2021-03-22 RX ORDER — KETOROLAC TROMETHAMINE 15 MG/ML
15 INJECTION, SOLUTION INTRAMUSCULAR; INTRAVENOUS ONCE
Status: COMPLETED | OUTPATIENT
Start: 2021-03-22 | End: 2021-03-22

## 2021-03-22 RX ORDER — DICYCLOMINE HCL 20 MG
20 TABLET ORAL 3 TIMES DAILY
Qty: 20 TABLET | Refills: 0 | Status: SHIPPED | OUTPATIENT
Start: 2021-03-22 | End: 2021-03-26

## 2021-03-22 RX ORDER — HYDROMORPHONE HYDROCHLORIDE 1 MG/ML
1 INJECTION, SOLUTION INTRAMUSCULAR; INTRAVENOUS; SUBCUTANEOUS EVERY 30 MIN PRN
Status: DISCONTINUED | OUTPATIENT
Start: 2021-03-22 | End: 2021-03-22

## 2021-03-22 RX ORDER — ONDANSETRON 2 MG/ML
4 INJECTION INTRAMUSCULAR; INTRAVENOUS ONCE
Status: COMPLETED | OUTPATIENT
Start: 2021-03-22 | End: 2021-03-22

## 2021-03-22 RX ORDER — OXYCODONE AND ACETAMINOPHEN 10; 325 MG/1; MG/1
1 TABLET ORAL EVERY 6 HOURS PRN
Qty: 10 TABLET | Refills: 0 | Status: SHIPPED | OUTPATIENT
Start: 2021-03-22 | End: 2021-03-26

## 2021-03-22 NOTE — ED PROVIDER NOTES
Patient Seen in: THE [facility] Emergency Department In [place]      History   Patient presents with:  Abdomen/Flank Pain    Stated Complaint: abd pain    HPI/Subjective:   HPI    Patient is a 66-year-old female comes to emergency room for evaluation of abdom Vitals [03/22/21 1102]   /66   Pulse 72   Resp 16   Temp 97.4 °F (36.3 °C)   Temp src Temporal   SpO2 100 %   O2 Device None (Room air)       Current:/75   Pulse 65   Temp 97.4 °F (36.3 °C) (Temporal)   Resp 16   Ht 162.6 cm (5' 4\")   Wt 61.7 Abnormality         Status                     ---------                               -----------         ------                     CBC W/ DIFFERENTIAL[[id number]]          Abnormal            Final result                 Please view r Clinical Impression:  Biliary dyskinesia  (primary encounter diagnosis)    Disposition:  Discharge  3/22/2021 12:42 pm    Follow-up:  DO Kandace Lowe  [address]  [phone]    In 1 day            Medicatio

## 2021-03-22 NOTE — ED INITIAL ASSESSMENT (HPI)
Pt c/o sharp/burning RUQ pain 9/10 that started this AM. Pt was seen on Thursday dx with gallbladder issue. Pt states she has been vomiting x3 days. States zofran has not been working for her. Pt is 7 weeks post-partum and breast feeding.

## 2021-03-23 ENCOUNTER — TELEPHONE (OUTPATIENT)
Dept: FAMILY MEDICINE CLINIC | Facility: CLINIC | Age: 21
End: 2021-03-23

## 2021-03-23 DIAGNOSIS — R10.11 RUQ PAIN: Primary | ICD-10-CM

## 2021-03-24 NOTE — ED NOTES
Called patient to inform of resistant culture and need for antibiotic. Left message to return my call.

## 2021-03-24 NOTE — ED NOTES
Unable to reach patient after messages left. Certified letter sent to patient home address listed in the EMR to inform of positive urine culture results and need for antibiotic. RX for keflex e precribed to pharmacy of patients choice listed in EMR.  Angelique

## 2021-03-25 ENCOUNTER — TELEPHONE (OUTPATIENT)
Dept: FAMILY MEDICINE CLINIC | Facility: CLINIC | Age: 21
End: 2021-03-25

## 2021-03-25 DIAGNOSIS — K82.9 GALLBLADDER ATTACK: Primary | ICD-10-CM

## 2021-03-25 NOTE — TELEPHONE ENCOUNTER
Pt is calling again on status and she will out of it tomorrow and she has to meet with the surgeon. She is only taking 2 per day not 4 like they told her she could if the pain was bad. Please send to [facility] in [place]. Please call patient when sent.

## 2021-03-25 NOTE — TELEPHONE ENCOUNTER
Pt requesting to ask if she can get refill on both Dicyclomine HCl 20 MG and oxyCODONE-acetaminophen  MG .  Ronda she has

## 2021-03-26 ENCOUNTER — TELEPHONE (OUTPATIENT)
Dept: FAMILY MEDICINE CLINIC | Facility: CLINIC | Age: 21
End: 2021-03-26

## 2021-03-26 ENCOUNTER — HOSPITAL ENCOUNTER (EMERGENCY)
Age: 21
Discharge: HOME | End: 2021-03-26
Attending: EMERGENCY MEDICINE
Payer: COMMERCIAL

## 2021-03-26 VITALS
RESPIRATION RATE: 16 BRPM | WEIGHT: 129 LBS | OXYGEN SATURATION: 99 % | HEART RATE: 54 BPM | DIASTOLIC BLOOD PRESSURE: 64 MMHG | HEIGHT: 64 IN | SYSTOLIC BLOOD PRESSURE: 109 MMHG | TEMPERATURE: 97 F | BODY MASS INDEX: 22.02 KG/M2

## 2021-03-26 DIAGNOSIS — R10.10 UPPER ABDOMINAL PAIN: Primary | ICD-10-CM

## 2021-03-26 LAB
ALBUMIN SERPL-MCNC: 3.9 G/DL (ref 3.4–5)
ALBUMIN/GLOB SERPL: 1.3 {RATIO} (ref 1–2)
ALP LIVER SERPL-CCNC: 68 U/L
ALT SERPL-CCNC: 11 U/L
ANION GAP SERPL CALC-SCNC: 4 MMOL/L (ref 0–18)
AST SERPL-CCNC: 10 U/L (ref 15–37)
BASOPHILS # BLD AUTO: 0.04 X10(3) UL (ref 0–0.2)
BASOPHILS NFR BLD AUTO: 0.6 %
BILIRUB SERPL-MCNC: 0.4 MG/DL (ref 0.1–2)
BUN BLD-MCNC: 9 MG/DL (ref 7–18)
BUN/CREAT SERPL: 11.3 (ref 10–20)
CALCIUM BLD-MCNC: 9.2 MG/DL (ref 8.5–10.1)
CHLORIDE SERPL-SCNC: 109 MMOL/L (ref 98–112)
CO2 SERPL-SCNC: 24 MMOL/L (ref 21–32)
CREAT BLD-MCNC: 0.8 MG/DL
DEPRECATED RDW RBC AUTO: 54.2 FL (ref 35.1–46.3)
EOSINOPHIL # BLD AUTO: 0.12 X10(3) UL (ref 0–0.7)
EOSINOPHIL NFR BLD AUTO: 1.9 %
ERYTHROCYTE [DISTWIDTH] IN BLOOD BY AUTOMATED COUNT: 18.1 % (ref 11–15)
GLOBULIN PLAS-MCNC: 3.1 G/DL (ref 2.8–4.4)
GLUCOSE BLD-MCNC: 90 MG/DL (ref 70–99)
HCT VFR BLD AUTO: 40.9 %
HGB BLD-MCNC: 12.7 G/DL
IMM GRANULOCYTES # BLD AUTO: 0.01 X10(3) UL (ref 0–1)
IMM GRANULOCYTES NFR BLD: 0.2 %
LIPASE SERPL-CCNC: 71 U/L (ref 73–393)
LYMPHOCYTES # BLD AUTO: 1.86 X10(3) UL (ref 1–4)
LYMPHOCYTES NFR BLD AUTO: 30.2 %
M PROTEIN MFR SERPL ELPH: 7 G/DL (ref 6.4–8.2)
MCH RBC QN AUTO: 25.7 PG (ref 26–34)
MCHC RBC AUTO-ENTMCNC: 31.1 G/DL (ref 31–37)
MCV RBC AUTO: 82.8 FL
MONOCYTES # BLD AUTO: 0.35 X10(3) UL (ref 0.1–1)
MONOCYTES NFR BLD AUTO: 5.7 %
NEUTROPHILS # BLD AUTO: 3.78 X10 (3) UL (ref 1.5–7.7)
NEUTROPHILS # BLD AUTO: 3.78 X10(3) UL (ref 1.5–7.7)
NEUTROPHILS NFR BLD AUTO: 61.4 %
OSMOLALITY SERPL CALC.SUM OF ELEC: 282 MOSM/KG (ref 275–295)
PLATELET # BLD AUTO: 220 10(3)UL (ref 150–450)
POTASSIUM SERPL-SCNC: 4.2 MMOL/L (ref 3.5–5.1)
RBC # BLD AUTO: 4.94 X10(6)UL
SODIUM SERPL-SCNC: 137 MMOL/L (ref 136–145)
WBC # BLD AUTO: 6.2 X10(3) UL (ref 4–11)

## 2021-03-26 PROCEDURE — 96375 TX/PRO/DX INJ NEW DRUG ADDON: CPT

## 2021-03-26 PROCEDURE — 99284 EMERGENCY DEPT VISIT MOD MDM: CPT

## 2021-03-26 PROCEDURE — 96376 TX/PRO/DX INJ SAME DRUG ADON: CPT

## 2021-03-26 PROCEDURE — 85025 COMPLETE CBC W/AUTO DIFF WBC: CPT | Performed by: EMERGENCY MEDICINE

## 2021-03-26 PROCEDURE — 83690 ASSAY OF LIPASE: CPT | Performed by: EMERGENCY MEDICINE

## 2021-03-26 PROCEDURE — 80053 COMPREHEN METABOLIC PANEL: CPT | Performed by: EMERGENCY MEDICINE

## 2021-03-26 PROCEDURE — 96374 THER/PROPH/DIAG INJ IV PUSH: CPT

## 2021-03-26 RX ORDER — DICYCLOMINE HCL 20 MG
20 TABLET ORAL 3 TIMES DAILY
Qty: 90 TABLET | Refills: 0 | Status: SHIPPED | OUTPATIENT
Start: 2021-03-26 | End: 2021-06-09

## 2021-03-26 RX ORDER — METOCLOPRAMIDE 10 MG/1
10 TABLET ORAL 3 TIMES DAILY PRN
Qty: 20 TABLET | Refills: 0 | Status: SHIPPED | OUTPATIENT
Start: 2021-03-26 | End: 2021-04-25

## 2021-03-26 RX ORDER — ONDANSETRON 2 MG/ML
4 INJECTION INTRAMUSCULAR; INTRAVENOUS ONCE
Status: COMPLETED | OUTPATIENT
Start: 2021-03-26 | End: 2021-03-26

## 2021-03-26 RX ORDER — ONDANSETRON 4 MG/1
4 TABLET, ORALLY DISINTEGRATING ORAL EVERY 6 HOURS PRN
Qty: 10 TABLET | Refills: 0 | Status: SHIPPED | OUTPATIENT
Start: 2021-03-26 | End: 2021-04-02

## 2021-03-26 RX ORDER — OXYCODONE AND ACETAMINOPHEN 10; 325 MG/1; MG/1
1 TABLET ORAL EVERY 8 HOURS PRN
Qty: 20 TABLET | Refills: 0 | Status: SHIPPED | OUTPATIENT
Start: 2021-03-26 | End: 2021-04-05

## 2021-03-26 RX ORDER — HYDROMORPHONE HYDROCHLORIDE 1 MG/ML
0.5 INJECTION, SOLUTION INTRAMUSCULAR; INTRAVENOUS; SUBCUTANEOUS ONCE
Status: COMPLETED | OUTPATIENT
Start: 2021-03-26 | End: 2021-03-26

## 2021-03-26 RX ORDER — CODEINE PHOSPHATE AND GUAIFENESIN 10; 100 MG/5ML; MG/5ML
SOLUTION ORAL
COMMUNITY
End: 2021-03-31

## 2021-03-26 NOTE — ED PROVIDER NOTES
Patient Seen in: THE [facility] Emergency Department In [place]      History   Patient presents with:  Abdomen/Flank Pain    Stated Complaint: abdominal pain     HPI/Subjective:   HPI    Patient is a 26-year-old female who presents with recurrent epigastric a systems reviewed and negative except as noted above.     Physical Exam     ED Triage Vitals [03/26/21 1314]   /69   Pulse 64   Resp 16   Temp 97.4 °F (36.3 °C)   Temp src Temporal   SpO2 98 %   O2 Device None (Room air)       Current:/64   Pulse The following orders were created for panel order CBC WITH DIFFERENTIAL WITH PLATELET.   Procedure                               Abnormality         Status                     ---------                               -----------         ------ 20 tablet Refills: 0    oxyCODONE-acetaminophen  MG Oral Tab  Take 1 tablet by mouth every 8 (eight) hours as needed for Pain.   Qty: 20 tablet Refills: 0  Associated Diagnoses:Gallbladder attack    Dicyclomine HCl 20 MG Oral Tab  Take 1 tablet (20 mg

## 2021-03-26 NOTE — TELEPHONE ENCOUNTER
Called and LMOM in detail that medication was refilled to [facility] and she can pick it up as soon as it is filled.

## 2021-03-26 NOTE — TELEPHONE ENCOUNTER
patient called again today talked to front crying because she has not heard back about medication refill.

## 2021-03-26 NOTE — TELEPHONE ENCOUNTER
Pt calling she was crying because she has not heard back from us about her medication she called about yesterday she is in severe abdominal pain and has appt with specialist for 3/31/21 and she just needs this pain med so she can make it to this appt.

## 2021-03-31 ENCOUNTER — OFFICE VISIT (OUTPATIENT)
Dept: SURGERY | Facility: CLINIC | Age: 21
End: 2021-03-31
Payer: COMMERCIAL

## 2021-03-31 VITALS
SYSTOLIC BLOOD PRESSURE: 114 MMHG | WEIGHT: 121.5 LBS | BODY MASS INDEX: 21 KG/M2 | HEART RATE: 100 BPM | TEMPERATURE: 97 F | DIASTOLIC BLOOD PRESSURE: 75 MMHG

## 2021-03-31 DIAGNOSIS — K80.50 BILIARY COLIC: ICD-10-CM

## 2021-03-31 DIAGNOSIS — K80.10 CALCULUS OF GALLBLADDER WITH CHOLECYSTITIS WITHOUT BILIARY OBSTRUCTION, UNSPECIFIED CHOLECYSTITIS ACUITY: Primary | ICD-10-CM

## 2021-03-31 PROCEDURE — 99244 OFF/OP CNSLTJ NEW/EST MOD 40: CPT | Performed by: SURGERY

## 2021-03-31 PROCEDURE — 3078F DIAST BP <80 MM HG: CPT | Performed by: SURGERY

## 2021-03-31 PROCEDURE — 3074F SYST BP LT 130 MM HG: CPT | Performed by: SURGERY

## 2021-03-31 NOTE — H&P
New Patient Visit Note       Active Problems      1. Calculus of gallbladder with cholecystitis without biliary obstruction, unspecified cholecystitis acuity    2.  Biliary colic        Chief Complaint   Patient presents with:  Gallbladder: pt c/o of severe today.     Past Medical History:   Diagnosis Date   • Acetaminophen overdose    • Anxiety    • Asthma    • Depression    • Extrinsic asthma, unspecified    • Migraines    • Psychiatric disorder    • PTSD (post-traumatic stress disorder)      Past Surgical [employer]) MCG/ACT Inhalation Aero Soln, Inhale 2 puffs into the lungs every 6 (six) hours as needed for Wheezing (cough). Ipratropium-Albuterol  MCG/ACT Inhalation Aero Soln, Inhale 2 puffs into the lungs every 4 to 6 hours.   acetaminophen 325 MG Oral T central scheduling immediately following the appointment and to take the next available time slot for this procedure. -If the HIDA scan shows gallbladder dysfunction, the patient should undergo a cholecystectomy.   If the gallbladder function is normal,

## 2021-04-02 ENCOUNTER — APPOINTMENT (OUTPATIENT)
Dept: ULTRASOUND IMAGING | Age: 21
End: 2021-04-02
Attending: EMERGENCY MEDICINE
Payer: COMMERCIAL

## 2021-04-02 ENCOUNTER — TELEPHONE (OUTPATIENT)
Dept: SURGERY | Facility: CLINIC | Age: 21
End: 2021-04-02

## 2021-04-02 ENCOUNTER — HOSPITAL ENCOUNTER (EMERGENCY)
Age: 21
Discharge: HOME | End: 2021-04-02
Attending: EMERGENCY MEDICINE
Payer: COMMERCIAL

## 2021-04-02 VITALS
DIASTOLIC BLOOD PRESSURE: 62 MMHG | TEMPERATURE: 98 F | HEART RATE: 50 BPM | WEIGHT: 120.63 LBS | SYSTOLIC BLOOD PRESSURE: 107 MMHG | HEIGHT: 64 IN | BODY MASS INDEX: 20.6 KG/M2 | RESPIRATION RATE: 16 BRPM | OXYGEN SATURATION: 99 %

## 2021-04-02 DIAGNOSIS — K80.10 CALCULUS OF GALLBLADDER WITH CHOLECYSTITIS WITHOUT BILIARY OBSTRUCTION, UNSPECIFIED CHOLECYSTITIS ACUITY: Primary | ICD-10-CM

## 2021-04-02 DIAGNOSIS — K82.8 BILIARY DYSKINESIA: Primary | ICD-10-CM

## 2021-04-02 PROCEDURE — 99284 EMERGENCY DEPT VISIT MOD MDM: CPT | Performed by: EMERGENCY MEDICINE

## 2021-04-02 PROCEDURE — 96361 HYDRATE IV INFUSION ADD-ON: CPT | Performed by: EMERGENCY MEDICINE

## 2021-04-02 PROCEDURE — 76700 US EXAM ABDOM COMPLETE: CPT | Performed by: EMERGENCY MEDICINE

## 2021-04-02 PROCEDURE — 81003 URINALYSIS AUTO W/O SCOPE: CPT | Performed by: EMERGENCY MEDICINE

## 2021-04-02 PROCEDURE — 96374 THER/PROPH/DIAG INJ IV PUSH: CPT | Performed by: EMERGENCY MEDICINE

## 2021-04-02 PROCEDURE — 96375 TX/PRO/DX INJ NEW DRUG ADDON: CPT | Performed by: EMERGENCY MEDICINE

## 2021-04-02 PROCEDURE — 83690 ASSAY OF LIPASE: CPT | Performed by: EMERGENCY MEDICINE

## 2021-04-02 PROCEDURE — 96376 TX/PRO/DX INJ SAME DRUG ADON: CPT | Performed by: EMERGENCY MEDICINE

## 2021-04-02 PROCEDURE — 85025 COMPLETE CBC W/AUTO DIFF WBC: CPT | Performed by: EMERGENCY MEDICINE

## 2021-04-02 PROCEDURE — 80053 COMPREHEN METABOLIC PANEL: CPT | Performed by: EMERGENCY MEDICINE

## 2021-04-02 PROCEDURE — 81025 URINE PREGNANCY TEST: CPT | Performed by: EMERGENCY MEDICINE

## 2021-04-02 RX ORDER — MORPHINE SULFATE 4 MG/ML
4 INJECTION, SOLUTION INTRAMUSCULAR; INTRAVENOUS ONCE
Status: DISCONTINUED | OUTPATIENT
Start: 2021-04-02 | End: 2021-04-02

## 2021-04-02 RX ORDER — KETOROLAC TROMETHAMINE 15 MG/ML
15 INJECTION, SOLUTION INTRAMUSCULAR; INTRAVENOUS ONCE
Status: COMPLETED | OUTPATIENT
Start: 2021-04-02 | End: 2021-04-02

## 2021-04-02 RX ORDER — HYDROCODONE BITARTRATE AND ACETAMINOPHEN 5; 325 MG/1; MG/1
1-2 TABLET ORAL EVERY 6 HOURS PRN
Qty: 10 TABLET | Refills: 0 | Status: SHIPPED | OUTPATIENT
Start: 2021-04-02 | End: 2021-04-09

## 2021-04-02 RX ORDER — MORPHINE SULFATE 4 MG/ML
4 INJECTION, SOLUTION INTRAMUSCULAR; INTRAVENOUS ONCE
Status: COMPLETED | OUTPATIENT
Start: 2021-04-02 | End: 2021-04-02

## 2021-04-02 RX ORDER — TRAMADOL HYDROCHLORIDE 50 MG/1
50 TABLET ORAL EVERY 6 HOURS PRN
Status: ON HOLD | COMMUNITY
End: 2021-04-19

## 2021-04-02 RX ORDER — ONDANSETRON 2 MG/ML
4 INJECTION INTRAMUSCULAR; INTRAVENOUS ONCE
Status: COMPLETED | OUTPATIENT
Start: 2021-04-02 | End: 2021-04-02

## 2021-04-02 NOTE — ED PROVIDER NOTES
Patient Seen in: [facility] Emergency Department In [place]      History   Patient presents with:  Abdomen/Flank Pain    Stated Complaint: abd pain, states gall bladder attack    HPI/Subjective:   HPI    51-year-old female with past medical history of lety Triage Vitals [04/02/21 1024]   /90   Pulse 66   Resp 16   Temp 97.9 °F (36.6 °C)   Temp src Temporal   SpO2 99 %   O2 Device None (Room air)       Current:/62   Pulse 50   Temp 97.9 °F (36.6 °C) (Temporal)   Resp 16   Ht 162.6 cm (5' 4\")   Wt Abnormality         Status                     ---------                               -----------         ------                     CBC W/ DIFFERENTIAL[[id number]]          Abnormal            Final result                 Please and the left kidney measures 9.7 cm in length. There is no hydronephrosis. .  IVC and Aorta: The visualized IVC and aorta appear normal in caliber            CONCLUSION:  1. No acute findings.   2. There are echogenic foci within the gallbladder, largest vik pancreatitis. Will give fluids, pain control, and reassess patient.    1:06 pm  Patient's work-up at this point is reassuring. On reassessment, she reports her pain is improved. I discussed case with Dr. Edgardo Ribera, who is on-call for patient's surgeon.   We

## 2021-04-05 RX ORDER — ACETAMINOPHEN 500 MG
1000 TABLET ORAL ONCE
Status: CANCELLED | OUTPATIENT
Start: 2021-04-05 | End: 2021-04-05

## 2021-04-08 ENCOUNTER — HOSPITAL ENCOUNTER (OUTPATIENT)
Dept: NUCLEAR MEDICINE | Facility: HOSPITAL | Age: 21
Discharge: HOME | End: 2021-04-08
Attending: SURGERY
Payer: COMMERCIAL

## 2021-04-08 DIAGNOSIS — R10.11 RUQ PAIN: ICD-10-CM

## 2021-04-08 PROCEDURE — 78227 HEPATOBIL SYST IMAGE W/DRUG: CPT | Performed by: SURGERY

## 2021-04-10 ENCOUNTER — HOSPITAL ENCOUNTER (EMERGENCY)
Age: 21
Discharge: HOME | End: 2021-04-10
Attending: EMERGENCY MEDICINE
Payer: COMMERCIAL

## 2021-04-10 VITALS
HEART RATE: 74 BPM | WEIGHT: 120 LBS | TEMPERATURE: 97 F | HEIGHT: 64 IN | BODY MASS INDEX: 20.49 KG/M2 | OXYGEN SATURATION: 97 % | SYSTOLIC BLOOD PRESSURE: 109 MMHG | RESPIRATION RATE: 16 BRPM | DIASTOLIC BLOOD PRESSURE: 67 MMHG

## 2021-04-10 DIAGNOSIS — R10.13 EPIGASTRIC PAIN: Primary | ICD-10-CM

## 2021-04-10 PROCEDURE — 99284 EMERGENCY DEPT VISIT MOD MDM: CPT

## 2021-04-10 PROCEDURE — 85025 COMPLETE CBC W/AUTO DIFF WBC: CPT | Performed by: EMERGENCY MEDICINE

## 2021-04-10 PROCEDURE — 96375 TX/PRO/DX INJ NEW DRUG ADDON: CPT

## 2021-04-10 PROCEDURE — 83690 ASSAY OF LIPASE: CPT | Performed by: EMERGENCY MEDICINE

## 2021-04-10 PROCEDURE — 96374 THER/PROPH/DIAG INJ IV PUSH: CPT

## 2021-04-10 PROCEDURE — 80053 COMPREHEN METABOLIC PANEL: CPT | Performed by: EMERGENCY MEDICINE

## 2021-04-10 PROCEDURE — 96361 HYDRATE IV INFUSION ADD-ON: CPT

## 2021-04-10 RX ORDER — DIPHENHYDRAMINE HYDROCHLORIDE 50 MG/ML
25 INJECTION INTRAMUSCULAR; INTRAVENOUS ONCE
Status: COMPLETED | OUTPATIENT
Start: 2021-04-10 | End: 2021-04-10

## 2021-04-10 RX ORDER — KETOROLAC TROMETHAMINE 15 MG/ML
15 INJECTION, SOLUTION INTRAMUSCULAR; INTRAVENOUS ONCE
Status: DISCONTINUED | OUTPATIENT
Start: 2021-04-10 | End: 2021-04-10

## 2021-04-10 RX ORDER — KETOROLAC TROMETHAMINE 15 MG/ML
15 INJECTION, SOLUTION INTRAMUSCULAR; INTRAVENOUS ONCE
Status: COMPLETED | OUTPATIENT
Start: 2021-04-10 | End: 2021-04-10

## 2021-04-10 RX ORDER — ONDANSETRON 4 MG/1
4 TABLET, ORALLY DISINTEGRATING ORAL EVERY 4 HOURS PRN
Qty: 10 TABLET | Refills: 0 | Status: SHIPPED | OUTPATIENT
Start: 2021-04-10 | End: 2021-04-17

## 2021-04-10 RX ORDER — METOCLOPRAMIDE HYDROCHLORIDE 5 MG/ML
10 INJECTION INTRAMUSCULAR; INTRAVENOUS ONCE
Status: COMPLETED | OUTPATIENT
Start: 2021-04-10 | End: 2021-04-10

## 2021-04-10 RX ORDER — HYDROMORPHONE HYDROCHLORIDE 1 MG/ML
1 INJECTION, SOLUTION INTRAMUSCULAR; INTRAVENOUS; SUBCUTANEOUS EVERY 30 MIN PRN
Status: DISCONTINUED | OUTPATIENT
Start: 2021-04-10 | End: 2021-04-10

## 2021-04-10 NOTE — ED PROVIDER NOTES
Patient Seen in: THE [facility] Emergency Department In [place]      History   Patient presents with:  Abdomen/Flank Pain    Stated Complaint: gallbladder attack, reports she had a hyda scan and is supposed to have gall bl*    HPI/Subjective:   HPI    Patient (36.3 °C)   Temp src Temporal   SpO2 99 %   O2 Device None (Room air)       Current:/67   Pulse 74   Temp 97.3 °F (36.3 °C) (Temporal)   Resp 16   Ht 162.6 cm (5' 4\")   Wt 54.4 kg   LMP 05/16/2020 (LMP Unknown)   SpO2 97%   BMI 20.60 kg/m²         P concerned that the Toradol will control her symptoms. Did discuss with patient the need to limit opiates as much as possible. Was given 1 dose of of IV pain medications here along with IV fluids.   Hopefully her surgery will help Recurrent epigastric pain

## 2021-04-15 ENCOUNTER — TELEPHONE (OUTPATIENT)
Dept: FAMILY MEDICINE CLINIC | Facility: CLINIC | Age: 21
End: 2021-04-15

## 2021-04-15 NOTE — TELEPHONE ENCOUNTER
Ivan Lorenzo is calling from DCFS to discuss Dr. Ronald Baron recent medication for depression.   She said the children and mother are both seen by them and they need to talk with Dr. Joao Agustin regarding the new dx and script and the reasoning and safety need behind it

## 2021-04-16 ENCOUNTER — LAB ENCOUNTER (OUTPATIENT)
Dept: LAB | Age: 21
End: 2021-04-16
Attending: SURGERY
Payer: COMMERCIAL

## 2021-04-16 ENCOUNTER — IMMUNIZATION (OUTPATIENT)
Dept: LAB | Age: 21
End: 2021-04-16
Attending: HOSPITALIST
Payer: COMMERCIAL

## 2021-04-16 DIAGNOSIS — Z23 NEED FOR VACCINATION: Primary | ICD-10-CM

## 2021-04-16 DIAGNOSIS — K82.4 GALLBLADDER POLYP: ICD-10-CM

## 2021-04-16 PROCEDURE — 0001A SARSCOV2 VAC 30MCG/0.3ML IM: CPT

## 2021-04-19 ENCOUNTER — ANESTHESIA EVENT (OUTPATIENT)
Dept: SURGERY | Facility: HOSPITAL | Age: 21
End: 2021-04-19
Payer: COMMERCIAL

## 2021-04-19 ENCOUNTER — APPOINTMENT (OUTPATIENT)
Dept: GENERAL RADIOLOGY | Facility: HOSPITAL | Age: 21
End: 2021-04-19
Attending: SURGERY
Payer: COMMERCIAL

## 2021-04-19 ENCOUNTER — HOSPITAL ENCOUNTER (OUTPATIENT)
Facility: HOSPITAL | Age: 21
Setting detail: HOSPITAL OUTPATIENT SURGERY
Discharge: HOME | End: 2021-04-19
Attending: SURGERY | Admitting: SURGERY
Payer: COMMERCIAL

## 2021-04-19 ENCOUNTER — ANESTHESIA (OUTPATIENT)
Dept: SURGERY | Facility: HOSPITAL | Age: 21
End: 2021-04-19
Payer: COMMERCIAL

## 2021-04-19 VITALS
HEART RATE: 50 BPM | OXYGEN SATURATION: 100 % | DIASTOLIC BLOOD PRESSURE: 68 MMHG | TEMPERATURE: 97 F | WEIGHT: 115.31 LBS | SYSTOLIC BLOOD PRESSURE: 98 MMHG | HEIGHT: 64 IN | BODY MASS INDEX: 19.68 KG/M2 | RESPIRATION RATE: 16 BRPM

## 2021-04-19 DIAGNOSIS — K82.4 GALLBLADDER POLYP: Primary | ICD-10-CM

## 2021-04-19 DIAGNOSIS — K80.10 CALCULUS OF GALLBLADDER WITH CHOLECYSTITIS WITHOUT BILIARY OBSTRUCTION, UNSPECIFIED CHOLECYSTITIS ACUITY: ICD-10-CM

## 2021-04-19 PROCEDURE — BF130ZZ FLUOROSCOPY OF GALLBLADDER AND BILE DUCTS USING HIGH OSMOLAR CONTRAST: ICD-10-PCS | Performed by: SURGERY

## 2021-04-19 PROCEDURE — 88304 TISSUE EXAM BY PATHOLOGIST: CPT | Performed by: SURGERY

## 2021-04-19 PROCEDURE — 74300 X-RAY BILE DUCTS/PANCREAS: CPT | Performed by: SURGERY

## 2021-04-19 PROCEDURE — 0FT44ZZ RESECTION OF GALLBLADDER, PERCUTANEOUS ENDOSCOPIC APPROACH: ICD-10-PCS | Performed by: SURGERY

## 2021-04-19 PROCEDURE — 81025 URINE PREGNANCY TEST: CPT | Performed by: SURGERY

## 2021-04-19 PROCEDURE — S0028 INJECTION, FAMOTIDINE, 20 MG: HCPCS | Performed by: NURSE ANESTHETIST, CERTIFIED REGISTERED

## 2021-04-19 RX ORDER — HYDROCODONE BITARTRATE AND ACETAMINOPHEN 5; 325 MG/1; MG/1
1 TABLET ORAL EVERY 6 HOURS PRN
Qty: 15 TABLET | Refills: 0 | Status: SHIPPED | OUTPATIENT
Start: 2021-04-19 | End: 2021-04-27

## 2021-04-19 RX ORDER — ONDANSETRON 2 MG/ML
4 INJECTION INTRAMUSCULAR; INTRAVENOUS AS NEEDED
Status: DISCONTINUED | OUTPATIENT
Start: 2021-04-19 | End: 2021-04-19

## 2021-04-19 RX ORDER — MEPERIDINE HYDROCHLORIDE 25 MG/ML
12.5 INJECTION INTRAMUSCULAR; INTRAVENOUS; SUBCUTANEOUS AS NEEDED
Status: DISCONTINUED | OUTPATIENT
Start: 2021-04-19 | End: 2021-04-19

## 2021-04-19 RX ORDER — DEXAMETHASONE SODIUM PHOSPHATE 4 MG/ML
VIAL (ML) INJECTION AS NEEDED
Status: DISCONTINUED | OUTPATIENT
Start: 2021-04-19 | End: 2021-04-19 | Stop reason: SURG

## 2021-04-19 RX ORDER — HYDROMORPHONE HYDROCHLORIDE 1 MG/ML
0.4 INJECTION, SOLUTION INTRAMUSCULAR; INTRAVENOUS; SUBCUTANEOUS EVERY 5 MIN PRN
Status: DISCONTINUED | OUTPATIENT
Start: 2021-04-19 | End: 2021-04-19

## 2021-04-19 RX ORDER — HYDROCODONE BITARTRATE AND ACETAMINOPHEN 5; 325 MG/1; MG/1
1 TABLET ORAL AS NEEDED
Status: COMPLETED | OUTPATIENT
Start: 2021-04-19 | End: 2021-04-19

## 2021-04-19 RX ORDER — HEPARIN SODIUM 5000 [USP'U]/ML
5000 INJECTION, SOLUTION INTRAVENOUS; SUBCUTANEOUS ONCE
Status: COMPLETED | OUTPATIENT
Start: 2021-04-19 | End: 2021-04-19

## 2021-04-19 RX ORDER — ONDANSETRON 2 MG/ML
INJECTION INTRAMUSCULAR; INTRAVENOUS AS NEEDED
Status: DISCONTINUED | OUTPATIENT
Start: 2021-04-19 | End: 2021-04-19 | Stop reason: SURG

## 2021-04-19 RX ORDER — HYDROMORPHONE HYDROCHLORIDE 1 MG/ML
INJECTION, SOLUTION INTRAMUSCULAR; INTRAVENOUS; SUBCUTANEOUS
Status: COMPLETED
Start: 2021-04-19 | End: 2021-04-19

## 2021-04-19 RX ORDER — MIDAZOLAM HYDROCHLORIDE 1 MG/ML
1 INJECTION INTRAMUSCULAR; INTRAVENOUS EVERY 5 MIN PRN
Status: DISCONTINUED | OUTPATIENT
Start: 2021-04-19 | End: 2021-04-19

## 2021-04-19 RX ORDER — SCOLOPAMINE TRANSDERMAL SYSTEM 1 MG/1
1 PATCH, EXTENDED RELEASE TRANSDERMAL ONCE
Status: DISCONTINUED | OUTPATIENT
Start: 2021-04-19 | End: 2021-04-19

## 2021-04-19 RX ORDER — HYDROCODONE BITARTRATE AND ACETAMINOPHEN 5; 325 MG/1; MG/1
2 TABLET ORAL AS NEEDED
Status: COMPLETED | OUTPATIENT
Start: 2021-04-19 | End: 2021-04-19

## 2021-04-19 RX ORDER — ROCURONIUM BROMIDE 10 MG/ML
INJECTION, SOLUTION INTRAVENOUS AS NEEDED
Status: DISCONTINUED | OUTPATIENT
Start: 2021-04-19 | End: 2021-04-19 | Stop reason: SURG

## 2021-04-19 RX ORDER — SODIUM CHLORIDE, SODIUM LACTATE, POTASSIUM CHLORIDE, CALCIUM CHLORIDE 600; 310; 30; 20 MG/100ML; MG/100ML; MG/100ML; MG/100ML
INJECTION, SOLUTION INTRAVENOUS CONTINUOUS
Status: DISCONTINUED | OUTPATIENT
Start: 2021-04-19 | End: 2021-04-19

## 2021-04-19 RX ORDER — ACETAMINOPHEN 500 MG
1000 TABLET ORAL ONCE AS NEEDED
Status: DISCONTINUED | OUTPATIENT
Start: 2021-04-19 | End: 2021-04-19

## 2021-04-19 RX ORDER — GLYCOPYRROLATE 0.2 MG/ML
INJECTION, SOLUTION INTRAMUSCULAR; INTRAVENOUS AS NEEDED
Status: DISCONTINUED | OUTPATIENT
Start: 2021-04-19 | End: 2021-04-19 | Stop reason: SURG

## 2021-04-19 RX ORDER — FAMOTIDINE 10 MG/ML
INJECTION, SOLUTION INTRAVENOUS AS NEEDED
Status: DISCONTINUED | OUTPATIENT
Start: 2021-04-19 | End: 2021-04-19 | Stop reason: SURG

## 2021-04-19 RX ORDER — CLINDAMYCIN PHOSPHATE 900 MG/50ML
900 INJECTION INTRAVENOUS ONCE
Status: COMPLETED | OUTPATIENT
Start: 2021-04-19 | End: 2021-04-19

## 2021-04-19 RX ORDER — METOCLOPRAMIDE HYDROCHLORIDE 5 MG/ML
INJECTION INTRAMUSCULAR; INTRAVENOUS AS NEEDED
Status: DISCONTINUED | OUTPATIENT
Start: 2021-04-19 | End: 2021-04-19 | Stop reason: SURG

## 2021-04-19 RX ORDER — LABETALOL HYDROCHLORIDE 5 MG/ML
5 INJECTION, SOLUTION INTRAVENOUS EVERY 5 MIN PRN
Status: DISCONTINUED | OUTPATIENT
Start: 2021-04-19 | End: 2021-04-19

## 2021-04-19 RX ORDER — NALOXONE HYDROCHLORIDE 0.4 MG/ML
80 INJECTION, SOLUTION INTRAMUSCULAR; INTRAVENOUS; SUBCUTANEOUS AS NEEDED
Status: DISCONTINUED | OUTPATIENT
Start: 2021-04-19 | End: 2021-04-19

## 2021-04-19 RX ORDER — NEOSTIGMINE METHYLSULFATE 1 MG/ML
INJECTION INTRAVENOUS AS NEEDED
Status: DISCONTINUED | OUTPATIENT
Start: 2021-04-19 | End: 2021-04-19 | Stop reason: SURG

## 2021-04-19 RX ORDER — LIDOCAINE HYDROCHLORIDE 10 MG/ML
INJECTION, SOLUTION EPIDURAL; INFILTRATION; INTRACAUDAL; PERINEURAL AS NEEDED
Status: DISCONTINUED | OUTPATIENT
Start: 2021-04-19 | End: 2021-04-19 | Stop reason: SURG

## 2021-04-19 RX ORDER — MIDAZOLAM HYDROCHLORIDE 1 MG/ML
INJECTION INTRAMUSCULAR; INTRAVENOUS AS NEEDED
Status: DISCONTINUED | OUTPATIENT
Start: 2021-04-19 | End: 2021-04-19 | Stop reason: SURG

## 2021-04-19 RX ADMIN — SODIUM CHLORIDE, SODIUM LACTATE, POTASSIUM CHLORIDE, CALCIUM CHLORIDE: 600; 310; 30; 20 INJECTION, SOLUTION INTRAVENOUS at 13:20:00

## 2021-04-19 RX ADMIN — CLINDAMYCIN PHOSPHATE 900 MG: 900 INJECTION INTRAVENOUS at 13:01:00

## 2021-04-19 RX ADMIN — METOCLOPRAMIDE HYDROCHLORIDE 10 MG: 5 INJECTION INTRAMUSCULAR; INTRAVENOUS at 12:59:00

## 2021-04-19 RX ADMIN — FAMOTIDINE 20 MG: 10 INJECTION, SOLUTION INTRAVENOUS at 12:59:00

## 2021-04-19 RX ADMIN — ONDANSETRON 4 MG: 2 INJECTION INTRAMUSCULAR; INTRAVENOUS at 12:59:00

## 2021-04-19 RX ADMIN — ROCURONIUM BROMIDE 40 MG: 10 INJECTION, SOLUTION INTRAVENOUS at 12:50:00

## 2021-04-19 RX ADMIN — NEOSTIGMINE METHYLSULFATE 3 MG: 1 INJECTION INTRAVENOUS at 13:36:00

## 2021-04-19 RX ADMIN — LIDOCAINE HYDROCHLORIDE 50 MG: 10 INJECTION, SOLUTION EPIDURAL; INFILTRATION; INTRACAUDAL; PERINEURAL at 12:50:00

## 2021-04-19 RX ADMIN — MIDAZOLAM HYDROCHLORIDE 2 MG: 1 INJECTION INTRAMUSCULAR; INTRAVENOUS at 12:46:00

## 2021-04-19 RX ADMIN — SODIUM CHLORIDE, SODIUM LACTATE, POTASSIUM CHLORIDE, CALCIUM CHLORIDE: 600; 310; 30; 20 INJECTION, SOLUTION INTRAVENOUS at 12:46:00

## 2021-04-19 RX ADMIN — DEXAMETHASONE SODIUM PHOSPHATE 8 MG: 4 MG/ML VIAL (ML) INJECTION at 12:59:00

## 2021-04-19 RX ADMIN — SODIUM CHLORIDE, SODIUM LACTATE, POTASSIUM CHLORIDE, CALCIUM CHLORIDE: 600; 310; 30; 20 INJECTION, SOLUTION INTRAVENOUS at 13:45:00

## 2021-04-19 RX ADMIN — GLYCOPYRROLATE 0.3 MG: 0.2 INJECTION, SOLUTION INTRAMUSCULAR; INTRAVENOUS at 13:36:00

## 2021-04-19 NOTE — OPERATIVE REPORT
[facility]  Operative Note     [place] [facility] Location: OR   CSN [id number] MRN [id number]   Admission Date 4/19/2021 Operation Date 4/19/2021   Attending Physician Jasmin Bella DO Operating Physician Kylie Barker DO      Preoperative Diagno was placed on the proximal cystic duct a small opening made in the cystic duct. C-arm cholangiography was carried out using full-strength Hypaque solution. Initially a guidewire was passed into the cystic duct followed by a ureteral catheter.   This was f

## 2021-04-19 NOTE — ANESTHESIA PROCEDURE NOTES
Airway  Urgency: elective    Airway not difficult    General Information and Staff    Patient location during procedure: OR  Anesthesiologist: Jamee Matson MD  Resident/CRNA: Diana Meraz CRNA  Performed: CRNA     Indications and Patient Condition  In

## 2021-04-19 NOTE — H&P
Gallbladder: pt c/o of severe RUQ abd pain radiating to back, N&V. pt has been to ED 3x in last 2 weeks.  US of abd done 3/18 at [facility]        History of Present Illness   Lollie Closs is a 24year old female referred to the clinic after 3 ER visits fo (post-traumatic stress disorder)              Past Surgical History:   Procedure Laterality Date   • REMOVAL OF TONSILS,12+ Y/O       • TONSILLECTOMY             The family history and social history have been reviewed by me today.           Family History Aero Soln, Inhale 2 puffs into the lungs every 4 to 6 hours. acetaminophen 325 MG Oral Tab, Take 325 mg by mouth every 6 (six) hours as needed for Pain.     No current facility-administered medications on file prior to visit.         Review of Systems  The should her symptoms continue after cholecystectomy that I am going to recommend that she see gastroenterology for further work-up. She understands this possibility.   She is also aware of the risks of the procedure to include bleeding infection pneumonia D

## 2021-04-19 NOTE — ANESTHESIA POSTPROCEDURE EVALUATION
[address] Patient Status:  Hospital Outpatient Surgery   Age/Gender 24year old female MRN [id number]   [facility] SURGERY Attending Gracie Amanda, [address] Day # 0 PCP Pancho Hamilton DO       Anesthesia Post-o

## 2021-04-19 NOTE — ANESTHESIA PREPROCEDURE EVALUATION
PRE-OP EVALUATION    Patient Name: [facility]    Admit Diagnosis: Calculus of gallbladder with cholecystitis without biliary obstruction, unspecified cholecystitis acuity [K80.10]    Pre-op Diagnosis: Calculus of gallbladder with cholecystitis w 04/10/2021    RDW 18.2 (H) 04/10/2021    .0 04/10/2021     Lab Results   Component Value Date     04/10/2021    K 4.1 04/10/2021     04/10/2021    CO2 24.0 04/10/2021    BUN 12 04/10/2021    CREATSERUM 0.78 04/10/2021    GLU 81 04/10/202

## 2021-04-20 ENCOUNTER — TELEPHONE (OUTPATIENT)
Dept: SURGERY | Facility: CLINIC | Age: 21
End: 2021-04-20

## 2021-04-20 ENCOUNTER — HOSPITAL ENCOUNTER (EMERGENCY)
Age: 21
Discharge: HOME | End: 2021-04-21
Attending: EMERGENCY MEDICINE
Payer: COMMERCIAL

## 2021-04-20 ENCOUNTER — APPOINTMENT (OUTPATIENT)
Dept: CT IMAGING | Age: 21
End: 2021-04-20
Attending: EMERGENCY MEDICINE
Payer: COMMERCIAL

## 2021-04-20 DIAGNOSIS — G89.18 POST-OPERATIVE PAIN: Primary | ICD-10-CM

## 2021-04-20 PROCEDURE — 80053 COMPREHEN METABOLIC PANEL: CPT | Performed by: EMERGENCY MEDICINE

## 2021-04-20 PROCEDURE — 83690 ASSAY OF LIPASE: CPT | Performed by: EMERGENCY MEDICINE

## 2021-04-20 PROCEDURE — 74177 CT ABD & PELVIS W/CONTRAST: CPT | Performed by: EMERGENCY MEDICINE

## 2021-04-20 PROCEDURE — 99284 EMERGENCY DEPT VISIT MOD MDM: CPT

## 2021-04-20 PROCEDURE — 96374 THER/PROPH/DIAG INJ IV PUSH: CPT

## 2021-04-20 PROCEDURE — 96361 HYDRATE IV INFUSION ADD-ON: CPT

## 2021-04-20 PROCEDURE — 96376 TX/PRO/DX INJ SAME DRUG ADON: CPT

## 2021-04-20 PROCEDURE — 85025 COMPLETE CBC W/AUTO DIFF WBC: CPT | Performed by: EMERGENCY MEDICINE

## 2021-04-20 PROCEDURE — 96375 TX/PRO/DX INJ NEW DRUG ADDON: CPT

## 2021-04-20 RX ORDER — LORAZEPAM 2 MG/ML
1 INJECTION INTRAMUSCULAR ONCE
Status: COMPLETED | OUTPATIENT
Start: 2021-04-20 | End: 2021-04-20

## 2021-04-20 RX ORDER — ONDANSETRON 2 MG/ML
4 INJECTION INTRAMUSCULAR; INTRAVENOUS ONCE
Status: COMPLETED | OUTPATIENT
Start: 2021-04-20 | End: 2021-04-20

## 2021-04-20 RX ORDER — MORPHINE SULFATE 4 MG/ML
4 INJECTION, SOLUTION INTRAMUSCULAR; INTRAVENOUS ONCE
Status: COMPLETED | OUTPATIENT
Start: 2021-04-20 | End: 2021-04-20

## 2021-04-20 NOTE — TELEPHONE ENCOUNTER
Patient left voicemail regarding pain management today at 712AM.    Called patient back. Had lap shabnam with RWM yesterday and c/o ongoing unmanaged pain. Currently rates 8/10. Took 2 tablets of Norco yesterday and no relief.  Added ibuprofen and still had n

## 2021-04-20 NOTE — TELEPHONE ENCOUNTER
Rosalinda MITCHELL will send script for Norco 10-325mg. Patient notified and verbalized understanding. Encouraged to call back if no relief of symptoms.

## 2021-04-21 ENCOUNTER — TELEPHONE (OUTPATIENT)
Dept: SURGERY | Facility: CLINIC | Age: 21
End: 2021-04-21

## 2021-04-21 VITALS
BODY MASS INDEX: 19.68 KG/M2 | TEMPERATURE: 98 F | OXYGEN SATURATION: 99 % | RESPIRATION RATE: 16 BRPM | HEART RATE: 51 BPM | DIASTOLIC BLOOD PRESSURE: 59 MMHG | HEIGHT: 64 IN | SYSTOLIC BLOOD PRESSURE: 98 MMHG | WEIGHT: 115.31 LBS

## 2021-04-21 NOTE — ED PROVIDER NOTES
Patient Seen in: THE [facility] Emergency Department In [place]      History   Patient presents with:  Postop/Procedure Problem  Nausea/Vomiting/Diarrhea    Stated Complaint: post-op vomiting, cholecystectomy yesterday     HPI/Subjective:   HPI      21-year-o 98 %   O2 Device None (Room air)       Current:/68   Pulse 56   Temp 98 °F (36.7 °C)   Resp 18   Ht 162.6 cm (5' 4\")   Wt 52.3 kg   LMP 05/16/2020 (LMP Unknown)   SpO2 99%   BMI 19.79 kg/m²         Physical Exam    General: Patient is awake, alert, outpatient. In the meantime she should return for new worsening symptoms such as increased pain, fever or persistent vomiting.                          Disposition and Plan     Clinical Impression:  Post-operative pain  (primary encounter diagnosis)

## 2021-04-21 NOTE — TELEPHONE ENCOUNTER
Pt seen in ED last night after slipping down stairs, message left to get additional pain medication. Script was sent yest. am for Norco 10-325mg. Informed pt that we cannot prescribe additional pain medication.  Patient requesting muscle relaxant, informed

## 2021-04-21 NOTE — ED QUICK NOTES
Pt states pain still severe md aware. Will give additional pain meds. Pt states nausea improved. Report and care endorsed to [facility].

## 2021-04-21 NOTE — ED QUICK NOTES
Pt c/o low back pain from fall down two steps and abd from vomiting. Mother in law at bedside. Comfort measures given to pt.

## 2021-04-21 NOTE — ED INITIAL ASSESSMENT (HPI)
Pt had cholecystectomy yesterday and states she vomited 3 tomes today. Pt states she took zofran for nausea but it did not help. Pt has reglan but states she did not take it. Pt called surgeon but has not received a call back.

## 2021-04-26 ENCOUNTER — TELEPHONE (OUTPATIENT)
Dept: FAMILY MEDICINE CLINIC | Facility: CLINIC | Age: 21
End: 2021-04-26

## 2021-04-26 NOTE — TELEPHONE ENCOUNTER
Refill request for:    Requested Prescriptions      No prescriptions requested or ordered in this encounter        Last Prescribed Quantity Refills   2/23/2021 30 1     LOV 2/23/2021     Patient was asked to follow-up in: 4 weeks    Appointment due: March

## 2021-04-27 ENCOUNTER — OFFICE VISIT (OUTPATIENT)
Dept: SURGERY | Facility: CLINIC | Age: 21
End: 2021-04-27

## 2021-04-27 VITALS — BODY MASS INDEX: 20 KG/M2 | WEIGHT: 115 LBS | TEMPERATURE: 97 F

## 2021-04-27 DIAGNOSIS — Z90.49 HISTORY OF CHOLECYSTECTOMY: Primary | ICD-10-CM

## 2021-04-27 PROBLEM — K82.4 GALLBLADDER POLYP: Status: RESOLVED | Noted: 2020-12-08 | Resolved: 2021-04-27

## 2021-04-27 PROCEDURE — 99024 POSTOP FOLLOW-UP VISIT: CPT | Performed by: PHYSICIAN ASSISTANT

## 2021-04-27 NOTE — PROGRESS NOTES
Post Operative Visit Note       Active Problems  1. History of cholecystectomy         Chief Complaint   Patient presents with:  Post-Op: p/o 4/19 lap shabnam. pt c/o of food intolerance.          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 Review of Systems   Constitutional: Negative for chills, diaphoresis, fatigue, fever and unexpected weight change. HENT: Negative for hearing loss, nosebleeds, sore throat and trouble swallowing.     Respi cholecystectomy  (primary encounter diagnosis)      Plan   1. I discussed with the patient that her loose bowel movements/diarrhea should improve over the next few weeks. 2. She is to continue with diet as tolerated.   3. She is to continue with lifting re

## 2021-05-03 ENCOUNTER — OFFICE VISIT (OUTPATIENT)
Dept: FAMILY MEDICINE CLINIC | Facility: CLINIC | Age: 21
End: 2021-05-03
Payer: COMMERCIAL

## 2021-05-03 VITALS
DIASTOLIC BLOOD PRESSURE: 60 MMHG | HEART RATE: 78 BPM | RESPIRATION RATE: 14 BRPM | TEMPERATURE: 97 F | WEIGHT: 110 LBS | HEIGHT: 64 IN | BODY MASS INDEX: 18.78 KG/M2 | SYSTOLIC BLOOD PRESSURE: 108 MMHG

## 2021-05-03 DIAGNOSIS — R19.7 VOMITING AND DIARRHEA: Primary | ICD-10-CM

## 2021-05-03 DIAGNOSIS — M24.9 HYPERMOBILE JOINTS: ICD-10-CM

## 2021-05-03 DIAGNOSIS — F41.8 POSTPARTUM ANXIETY: ICD-10-CM

## 2021-05-03 DIAGNOSIS — M54.50 ACUTE RIGHT-SIDED LOW BACK PAIN WITHOUT SCIATICA: ICD-10-CM

## 2021-05-03 DIAGNOSIS — R10.9 LEFT FLANK PAIN: ICD-10-CM

## 2021-05-03 DIAGNOSIS — R55 SYNCOPE, UNSPECIFIED SYNCOPE TYPE: ICD-10-CM

## 2021-05-03 DIAGNOSIS — R11.10 VOMITING AND DIARRHEA: Primary | ICD-10-CM

## 2021-05-03 PROCEDURE — 99214 OFFICE O/P EST MOD 30 MIN: CPT | Performed by: FAMILY MEDICINE

## 2021-05-03 PROCEDURE — 3074F SYST BP LT 130 MM HG: CPT | Performed by: FAMILY MEDICINE

## 2021-05-03 PROCEDURE — 3078F DIAST BP <80 MM HG: CPT | Performed by: FAMILY MEDICINE

## 2021-05-03 PROCEDURE — 3008F BODY MASS INDEX DOCD: CPT | Performed by: FAMILY MEDICINE

## 2021-05-03 PROCEDURE — 96372 THER/PROPH/DIAG INJ SC/IM: CPT | Performed by: FAMILY MEDICINE

## 2021-05-03 RX ORDER — KETOROLAC TROMETHAMINE 30 MG/ML
30 INJECTION, SOLUTION INTRAMUSCULAR; INTRAVENOUS ONCE
Status: COMPLETED | OUTPATIENT
Start: 2021-05-03 | End: 2021-05-03

## 2021-05-03 RX ORDER — ESCITALOPRAM OXALATE 10 MG/1
10 TABLET ORAL DAILY
Qty: 90 TABLET | Refills: 1 | Status: SHIPPED | OUTPATIENT
Start: 2021-05-03

## 2021-05-03 RX ADMIN — KETOROLAC TROMETHAMINE 30 MG: 30 INJECTION, SOLUTION INTRAMUSCULAR; INTRAVENOUS at 11:35:00

## 2021-05-03 NOTE — PROGRESS NOTES
Chief Complaint:  Patient presents with:  Depression: Follow Up from Postpartum meds  Vomiting: Feeling Nauseous and Vomiting since post surgery 04-   Diarrhea  Referral: Requesting to see   Back Pain: Sharp Low Back Pain       HPI:  This Readings from Last 6 Encounters:  05/03/21 : 110 lb (49.9 kg)  04/27/21 : 115 lb (52.2 kg)  04/20/21 : 115 lb 4.8 oz (52.3 kg)  04/19/21 : 115 lb 4.8 oz (52.3 kg)  04/10/21 : 120 lb (54.4 kg)  04/02/21 : 120 lb 9.6 oz (54.7 kg)    Health Maintenance:  Pneu History    Tobacco Use      Smoking status: Never Smoker      Smokeless tobacco: Never Used    Vaping Use      Vaping Use: Never used    Alcohol use: No    Drug use: Yes      Types: Cannabis      Comment: THC Gummies- &CBD nightly       Current Outpatient bilaterally, no wheezes, rales or rhonchi, good air movement  CV: HRRR, no murmurs, no peripheral edema   ABD: Soft, BSx4, R sided tenderness, non-distended, no guarding or rebound tenderness  EXTREMITIES: warm and well perfused  PSYCH: pleasant and dalton WITH PLATELET; Future  -     COMP METABOLIC PANEL (14); Future  -     LIPASE; Future    Syncope, unspecified syncope type  Patient not discussed this with me previously (perhaps in remote past).  Although she had a negative past reported  work up, symptoms

## 2021-05-04 ENCOUNTER — LAB ENCOUNTER (OUTPATIENT)
Dept: LAB | Age: 21
End: 2021-05-04
Attending: FAMILY MEDICINE
Payer: COMMERCIAL

## 2021-05-04 DIAGNOSIS — R19.7 VOMITING AND DIARRHEA: ICD-10-CM

## 2021-05-04 DIAGNOSIS — R11.10 VOMITING AND DIARRHEA: ICD-10-CM

## 2021-05-04 PROCEDURE — 85025 COMPLETE CBC W/AUTO DIFF WBC: CPT | Performed by: FAMILY MEDICINE

## 2021-05-04 PROCEDURE — 80053 COMPREHEN METABOLIC PANEL: CPT | Performed by: FAMILY MEDICINE

## 2021-05-04 PROCEDURE — 83690 ASSAY OF LIPASE: CPT | Performed by: FAMILY MEDICINE

## 2021-05-06 ENCOUNTER — ORDER TRANSCRIPTION (OUTPATIENT)
Dept: ADMINISTRATIVE | Facility: HOSPITAL | Age: 21
End: 2021-05-06

## 2021-05-06 ENCOUNTER — TELEPHONE (OUTPATIENT)
Dept: FAMILY MEDICINE CLINIC | Facility: CLINIC | Age: 21
End: 2021-05-06

## 2021-05-06 ENCOUNTER — TELEPHONE (OUTPATIENT)
Dept: GENETICS | Facility: HOSPITAL | Age: 21
End: 2021-05-06

## 2021-05-06 DIAGNOSIS — M24.80 GENERALIZED HYPERMOBILITY OF JOINTS: ICD-10-CM

## 2021-05-06 DIAGNOSIS — M24.9 HYPERMOBILE JOINTS: ICD-10-CM

## 2021-05-06 DIAGNOSIS — R07.2 PRECORDIAL PAIN: Primary | ICD-10-CM

## 2021-05-06 DIAGNOSIS — R11.2 INTRACTABLE VOMITING WITH NAUSEA, UNSPECIFIED VOMITING TYPE: Primary | ICD-10-CM

## 2021-05-06 DIAGNOSIS — R55 SYNCOPE AND COLLAPSE: ICD-10-CM

## 2021-05-06 DIAGNOSIS — R55 SYNCOPE, UNSPECIFIED SYNCOPE TYPE: Primary | ICD-10-CM

## 2021-05-06 RX ORDER — ONDANSETRON 4 MG/1
4 TABLET, ORALLY DISINTEGRATING ORAL EVERY 8 HOURS PRN
Qty: 20 TABLET | Refills: 1 | Status: SHIPPED | OUTPATIENT
Start: 2021-05-06

## 2021-05-06 NOTE — TELEPHONE ENCOUNTER
Referral placed. Could she not go somewhere local?  Timmy Medley- sending to you as an FYI. Please let me know if you have any questions.   Michael Das, DO 5/6/2021 1:36 PM

## 2021-05-06 NOTE — TELEPHONE ENCOUNTER
Referral request '      Dr. Lubna Mccord M.D.   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

## 2021-05-06 NOTE — TELEPHONE ENCOUNTER
lvm to inform pt her genetic consult appt will be canceled. We do not see pt's for that disease per teja advised other locations given by Jose Maria Worthy.  ezio

## 2021-05-06 NOTE — TELEPHONE ENCOUNTER
Patients needs genetic referral@ [phone] fax # [phone]  office is DR Americo Tinajero  This is for genetic testing for  [facility] Sydrome testing. Please notify patient on how to proceed

## 2021-05-07 ENCOUNTER — IMMUNIZATION (OUTPATIENT)
Dept: LAB | Age: 21
End: 2021-05-07
Attending: HOSPITALIST
Payer: COMMERCIAL

## 2021-05-07 DIAGNOSIS — Z23 NEED FOR VACCINATION: Primary | ICD-10-CM

## 2021-05-07 PROCEDURE — 0002A SARSCOV2 VAC 30MCG/0.3ML IM: CPT

## 2021-05-13 ENCOUNTER — HOSPITAL ENCOUNTER (OUTPATIENT)
Dept: CT IMAGING | Facility: HOSPITAL | Age: 21
Discharge: HOME | End: 2021-05-13
Attending: INTERNAL MEDICINE
Payer: COMMERCIAL

## 2021-05-13 ENCOUNTER — APPOINTMENT (OUTPATIENT)
Dept: GENETICS | Facility: HOSPITAL | Age: 21
End: 2021-05-13
Payer: COMMERCIAL

## 2021-05-13 ENCOUNTER — PATIENT MESSAGE (OUTPATIENT)
Dept: FAMILY MEDICINE CLINIC | Facility: CLINIC | Age: 21
End: 2021-05-13

## 2021-05-13 ENCOUNTER — MED REC SCAN ONLY (OUTPATIENT)
Dept: SURGERY | Facility: CLINIC | Age: 21
End: 2021-05-13

## 2021-05-13 VITALS — WEIGHT: 110 LBS | HEIGHT: 64 IN | BODY MASS INDEX: 18.78 KG/M2

## 2021-05-13 DIAGNOSIS — M24.80 GENERALIZED HYPERMOBILITY OF JOINTS: ICD-10-CM

## 2021-05-13 DIAGNOSIS — R55 SYNCOPE AND COLLAPSE: ICD-10-CM

## 2021-05-13 DIAGNOSIS — R07.2 PRECORDIAL PAIN: ICD-10-CM

## 2021-05-13 PROCEDURE — 75574 CT ANGIO HRT W/3D IMAGE: CPT | Performed by: INTERNAL MEDICINE

## 2021-05-13 RX ORDER — DILTIAZEM HYDROCHLORIDE 5 MG/ML
5 INJECTION INTRAVENOUS SEE ADMIN INSTRUCTIONS
Status: DISCONTINUED | OUTPATIENT
Start: 2021-05-13 | End: 2021-05-15

## 2021-05-13 RX ORDER — NITROGLYCERIN 0.4 MG/1
TABLET SUBLINGUAL
Status: DISPENSED
Start: 2021-05-13 | End: 2021-05-13

## 2021-05-13 RX ORDER — METOPROLOL TARTRATE 5 MG/5ML
5 INJECTION INTRAVENOUS SEE ADMIN INSTRUCTIONS
Status: DISCONTINUED | OUTPATIENT
Start: 2021-05-13 | End: 2021-05-15

## 2021-05-13 RX ORDER — NITROGLYCERIN 0.4 MG/1
0.4 TABLET SUBLINGUAL ONCE
Status: COMPLETED | OUTPATIENT
Start: 2021-05-13 | End: 2021-05-13

## 2021-05-13 RX ADMIN — NITROGLYCERIN 0.4 MG: 0.4 TABLET SUBLINGUAL at 08:27:00

## 2021-05-13 NOTE — IMAGING NOTE
TO RAD HOLDING AT 0755      HX TAKEN :precordial pain    PT CONSENTED AT 0807     BASELINE VITAL SIGNS   HR 47  /54  BMI 18.8 /  lb    CTA ORDERED BY DR ERWIN  WAS PT GIVEN CTA  PREMEDS NO, IF YES 50 MG PO X 2 DOSES    18 GAUGE IV STARTED AT 0

## 2021-05-13 NOTE — TELEPHONE ENCOUNTER
From: Scott Fernández  To:  Dana Heath DO  Sent: 5/13/2021 12:11 PM CDT  Subject: Non-Urgent Medical Question    Sorry I forgot to add that both my father and mother smoked around me a lot as a kid, I also used cigarettes at 13 for about a year an

## 2021-05-22 ENCOUNTER — PATIENT MESSAGE (OUTPATIENT)
Dept: FAMILY MEDICINE CLINIC | Facility: CLINIC | Age: 21
End: 2021-05-22

## 2021-05-24 NOTE — TELEPHONE ENCOUNTER
Giovana Franco is still having c/o vomiting,diarrhea and abdominal pain. Giovana Franco says her surgeon thought it would be best to f/u with the  regarding these complaints. She now is noticing suture material protruding from the laparoscopic incisions.  Advised h

## 2021-05-24 NOTE — TELEPHONE ENCOUNTER
From: Félix Rutherford  To:  Mortimer Lapidus, DO  Sent: 5/22/2021 11:07 PM CDT  Subject: Non-Urgent Medical Question    Hi dr Briseida Martinez,     I'm sorry for so many messages lately but I noticed at my incision sited I have some dissolvable stitches poking out

## 2021-05-27 ENCOUNTER — HOSPITAL ENCOUNTER (OUTPATIENT)
Age: 21
Discharge: HOME | End: 2021-05-27
Payer: COMMERCIAL

## 2021-05-27 ENCOUNTER — APPOINTMENT (OUTPATIENT)
Dept: GENERAL RADIOLOGY | Age: 21
End: 2021-05-27
Attending: NURSE PRACTITIONER
Payer: COMMERCIAL

## 2021-05-27 VITALS
BODY MASS INDEX: 18.78 KG/M2 | RESPIRATION RATE: 18 BRPM | DIASTOLIC BLOOD PRESSURE: 80 MMHG | SYSTOLIC BLOOD PRESSURE: 125 MMHG | TEMPERATURE: 97 F | OXYGEN SATURATION: 96 % | WEIGHT: 110 LBS | HEIGHT: 64 IN | HEART RATE: 87 BPM

## 2021-05-27 DIAGNOSIS — R06.2 WHEEZING: Primary | ICD-10-CM

## 2021-05-27 DIAGNOSIS — J45.901 MILD ASTHMA WITH EXACERBATION, UNSPECIFIED WHETHER PERSISTENT: ICD-10-CM

## 2021-05-27 PROCEDURE — 99214 OFFICE O/P EST MOD 30 MIN: CPT | Performed by: NURSE PRACTITIONER

## 2021-05-27 PROCEDURE — U0002 COVID-19 LAB TEST NON-CDC: HCPCS | Performed by: NURSE PRACTITIONER

## 2021-05-27 PROCEDURE — 71046 X-RAY EXAM CHEST 2 VIEWS: CPT | Performed by: NURSE PRACTITIONER

## 2021-05-27 RX ORDER — PREDNISONE 20 MG/1
40 TABLET ORAL ONCE
Status: COMPLETED | OUTPATIENT
Start: 2021-05-27 | End: 2021-05-27

## 2021-05-27 RX ORDER — PREDNISONE 20 MG/1
20 TABLET ORAL 2 TIMES DAILY
Qty: 10 TABLET | Refills: 0 | Status: SHIPPED | OUTPATIENT
Start: 2021-05-28 | End: 2021-06-02

## 2021-05-27 RX ORDER — ALBUTEROL SULFATE 90 UG/1
8 AEROSOL, METERED RESPIRATORY (INHALATION) ONCE
Status: COMPLETED | OUTPATIENT
Start: 2021-05-27 | End: 2021-05-27

## 2021-05-27 RX ORDER — BENZONATATE 100 MG/1
100 CAPSULE ORAL 3 TIMES DAILY PRN
Qty: 30 CAPSULE | Refills: 0 | Status: SHIPPED | OUTPATIENT
Start: 2021-05-27 | End: 2021-06-09

## 2021-05-27 NOTE — ED INITIAL ASSESSMENT (HPI)
Cough and sore throat 3 days ago, now feels worsening wheezing, fever last night, up to 102. Took tylenol today and duoneb, slight relief. Cough worsening  And unable to breath. Now rib cage hurts due to cough.

## 2021-05-27 NOTE — ED PROVIDER NOTES
Patient Seen in: Immediate [address] [place] [address]      History   Patient presents with:  Cough/URI    Stated Complaint: cough/wheezing    HPI/Subjective:   20-year-old female who presents to the [address] [place] [address] with asthma flareup patient has been having coughing and increased negative except as noted above.     Physical Exam     ED Triage Vitals [05/27/21 1055]   /80   Pulse 87   Resp 18   Temp 97.4 °F (36.3 °C)   Temp src Temporal   SpO2 96 %   O2 Device None (Room air)       Current:/80   Pulse 87   Temp 97.4 °F (3 RAPID SARS-COV-2 BY PCR - Normal     XR CHEST PA + LAT CHEST (CPT=71046)    Result Date: 5/27/2021  PROCEDURE:  XR CHEST PA + LAT CHEST (CPT=71046)  INDICATIONS:  cough/wheezing  COMPARISON:  [facility], XR, XR CHEST AP PORTABLE  (CPT=71045), 5/06/2020, Value        Reference  LV ID, ED, PLAX                                 4.1   cm     3.9 - 5.3  LV ID, ES, PLAX                                 2.8   cm     ---------  LV fx shortening, PLAX                          31    %      27 - 45  LV mid- Value        Reference  Aortic root ID, ED                              2.4   cm     <3.2  Ascending aorta ID, A-P, ED                     2.4   cm     ---------   Left atrium                                     Value        Reference  LA ID, A-P, ES normal. Systolic function is normal.  Right ventricular systolic pressure could not be estimated. Left atrium:  The atrium is normal in size. Right atrium:  The atrium is normal in size. Aortic valve:   Structurally normal valve. Trileaflet.   Doppler: Sharon Guadalupe CARDIAC OVER READ    Result Date: 5/13/2021  PROCEDURE: CT CARDIAC OVER READ  COMPARISON: None. INDICATIONS: Syncope and collapse, Generalized hypermobility of joints, Precordial pain  TECHNIQUE:   CT coronary artery study was performed.  The raw data from seen.  OTHER: Negative. Please see separate report for CTA Coronary Arteries. CONCLUSION:  1. Cardiac CT over read performed. 2. Small bilateral pulmonary nodules, the largest of which measure up to 4 mm.   Given small size and patient age, benign Not performed FINDINGS: LEFT VENTRICLE:  Chamber size is normal. Wall thickness is normal. Myocardium is normal. LEFT ATRIUM:  Chamber size is normal. Attachment is normal. The left atrial appendage is well seen. There is no atrial or appendage thrombus.  I Patient has equal rise and fall chest wall symmetrically              MDM   I have discussed with the patient and/or family the results of test, differential diagnosis, treatment plan, warning signs and symptoms which should prompt immediate return.   The p

## 2021-06-07 ENCOUNTER — TELEPHONE (OUTPATIENT)
Dept: FAMILY MEDICINE CLINIC | Facility: CLINIC | Age: 21
End: 2021-06-07

## 2021-06-07 NOTE — TELEPHONE ENCOUNTER
Pt is calling she has been dealing with constant vomiting and weight loss, she can only keep liquids down. Gatorade only not even broth. If she gets it down she gets severe abdominal pain and horrible diarrhea.  She is coming in on 6/9/21 with Suhas Velarde

## 2021-06-07 NOTE — TELEPHONE ENCOUNTER
Called pt. She is tearful and frustrated. She states that she is only able to keep down clear liquids. She has intermittent right upper and mid abdominal pain for the past 1.5 months. She is also experiencing diarrhea. She is also losing weight. [facility]

## 2021-06-08 PROBLEM — R07.89 ATYPICAL CHEST PAIN: Status: ACTIVE | Noted: 2021-06-08

## 2021-06-09 ENCOUNTER — OFFICE VISIT (OUTPATIENT)
Dept: FAMILY MEDICINE CLINIC | Facility: CLINIC | Age: 21
End: 2021-06-09
Payer: COMMERCIAL

## 2021-06-09 ENCOUNTER — TELEPHONE (OUTPATIENT)
Dept: FAMILY MEDICINE CLINIC | Facility: CLINIC | Age: 21
End: 2021-06-09

## 2021-06-09 VITALS
HEIGHT: 64 IN | RESPIRATION RATE: 18 BRPM | DIASTOLIC BLOOD PRESSURE: 74 MMHG | BODY MASS INDEX: 17.65 KG/M2 | HEART RATE: 96 BPM | WEIGHT: 103.38 LBS | SYSTOLIC BLOOD PRESSURE: 120 MMHG

## 2021-06-09 DIAGNOSIS — R11.2 INTRACTABLE VOMITING WITH NAUSEA, UNSPECIFIED VOMITING TYPE: Primary | ICD-10-CM

## 2021-06-09 DIAGNOSIS — R10.84 GENERALIZED ABDOMINAL PAIN: ICD-10-CM

## 2021-06-09 PROCEDURE — 3008F BODY MASS INDEX DOCD: CPT | Performed by: PHYSICIAN ASSISTANT

## 2021-06-09 PROCEDURE — 3074F SYST BP LT 130 MM HG: CPT | Performed by: PHYSICIAN ASSISTANT

## 2021-06-09 PROCEDURE — 3078F DIAST BP <80 MM HG: CPT | Performed by: PHYSICIAN ASSISTANT

## 2021-06-09 PROCEDURE — 99214 OFFICE O/P EST MOD 30 MIN: CPT | Performed by: PHYSICIAN ASSISTANT

## 2021-06-09 RX ORDER — PROMETHAZINE HYDROCHLORIDE 25 MG/1
25 TABLET ORAL EVERY 6 HOURS PRN
Qty: 30 TABLET | Refills: 0 | Status: SHIPPED | OUTPATIENT
Start: 2021-06-09

## 2021-06-09 NOTE — TELEPHONE ENCOUNTER
After 5.3 minutes on hold I spoke with Emma Coyle, at [address] [place] [address] who will call patient and assist with scheduling and appt sooner

## 2021-06-09 NOTE — TELEPHONE ENCOUNTER
Pt was in today to see Toni Francois and she got a referral for gastro due to vomiting. Pt came back in and stated she is not able to get in until 06/24/21. Chevy Foss is requesting for triage to reach out to gastro to see if pt can get in earlier.

## 2021-06-09 NOTE — PROGRESS NOTES
Patient presents with:  Stomach Pain: been throwing up, dizziness, nausea, abdominal pain says it's getting worse and she recently went to the ER april 20  Weight Loss: wants to discuss   Referral: for GI       HISTORY OF PRESENT ILLNESS  Valery Alonso is every 4 (four) hours as needed for Wheezing., Disp: 1 Box, Rfl: 1  •  Albuterol Sulfate  (90 Base) MCG/ACT Inhalation Aero Soln, Inhale 2 puffs into the lungs every 6 (six) hours as needed for Wheezing (cough). , Disp: 1 Inhaler, Rfl: 0  •  acetamino needs to see GI to figure out next steps. Syncopal episodes almost certainly occurring because of lack of food intake/ dehydration. Will send phenergan to see if that helps with symptoms. Patient expresses understanding and agreement with above plan.

## 2021-06-09 NOTE — TELEPHONE ENCOUNTER
Brinda Herrera, [address] [facility] 22 [phone]      Called was on hold for 10 min phone never picked up.

## 2021-06-15 PROBLEM — R10.9 ABDOMINAL PAIN: Status: ACTIVE | Noted: 2021-06-15

## 2021-06-15 PROBLEM — F41.9 ANXIETY: Status: ACTIVE | Noted: 2021-06-15

## 2021-06-15 PROBLEM — R11.2 NAUSEA AND VOMITING: Status: ACTIVE | Noted: 2021-06-15

## 2021-06-15 PROBLEM — F12.90 CONTINUOUS CANNABIS USE: Status: ACTIVE | Noted: 2021-06-15

## 2021-06-15 PROBLEM — R19.7 DIARRHEA: Status: ACTIVE | Noted: 2021-06-15

## 2021-06-15 PROBLEM — R63.4 WEIGHT LOSS: Status: ACTIVE | Noted: 2021-06-15

## 2021-06-15 NOTE — ED INITIAL ASSESSMENT (HPI)
Armando Dodson 27year old female 1990    Chief Complaint   Patient presents with   â¢ Pain     left side joints       HISTORY OF PRESENT ILLNESS:  The patient is a 27year old female who present today with a chief complaint of left hand numbness specifically to the left 4th an 5th fingers, left shoulder pain,  left arm and leg weakness. These symptoms have been present for three weeks. It comes and goes and she denies joint swelling. Her pain is described as tingling and numb in quality, a 4 out of 10 in severity. She is unsure of exacerbating or alleviating factors. She is also following up on her systemic lupus and lupus pneumonitis. She denies fevers, night sweats or muscle weakness. The patient also takes Plaquenil and mycophenolate for her Lupus. There is some residual fatigue. Offers no other complaints. REVIEW OF SYSTEMS:  No fevers, no oral ulcers, no alopecia, no Raynaud phenomenon, no eye inflammation, no visual changes, no rashes, no chest pain, no pleurisy, no shortness of breath, no dyspnea on exertion, no hematuria, no melena, no bright red blood per rectum, no nausea, no vomiting, no dysphagia, no bleeding, no seizures. Past Medical History:   Diagnosis Date   â¢ Acute parotitis    â¢ Cervical lymphadenopathy    â¢ Fever    â¢ Iron deficiency anemia    â¢ Leukopenia    â¢ Long-term use of immunosuppressant medication 9/10/2019   â¢ Lupus (systemic lupus erythematosus) (CMS/[facility])    â¢ Mixed connective tissue disease (CMS/[facility])    â¢ Neutropenia (CMS/[facility])    â¢ Other long term (current) drug therapy    â¢ Other specific arthropathies, not elsewhere classified, multiple sites    â¢ Pleurisy    â¢ Raynaud's syndrome without gangrene    â¢ Rheumatoid factor positive    â¢ Shingles    â¢ Thrombocytopenia (CMS/[facility])    â¢ Vitamin D deficiency, unspecified         No past surgical history on file.     Current Outpatient Medications   Medication Sig Dispense Refill   â¢ mycophenolate (CELLCEPT) 500 MG RUQ abdom pain since last night approx 45 min after dinner, pain is not going away, nausea/vomiting 6 times since last night, denies fever/cough, due for gall bladder surgery April 19, Hida scan April 8 tablet Take 2 tablets by mouth 2 times daily. 360 tablet 3   â¢ hydroxychloroquine (PLAQUENIL) 200 MG tablet Take one tablet twice daily Monday - Wednesday, then one tab qd Thursday - Sunday. 120 tablet 3   â¢ methylPREDNISolone (MEDROL) 4 MG tablet Take 1.5 tab qd x on week then one tab qd. 94 tablet 3   â¢ ondansetron (Zofran ODT) 4 MG disintegrating tablet Place 1 tablet onto the tongue every 8 hours as needed for Nausea. 10 tablet 0   â¢ ibuprofen (MOTRIN) 200 MG tablet Take 600 mg by mouth every 6 hours as needed for Pain. â¢ acetaminophen (TYLENOL) 500 MG tablet Take 500 mg by mouth as needed. No current facility-administered medications for this visit. ALLERGIES:   Allergen Reactions   â¢ Iodine   (Environmental Or Med) ANAPHYLAXIS     Other reaction(s): Anaphylaxis, EENT - angioedema   â¢ Ampicillin Other (See Comments)     Low BP    Patient has tolerated ceftriaxone and cefadroxil in the past        Social History     Tobacco Use   â¢ Smoking status: Never Smoker   â¢ Smokeless tobacco: Never Used   Substance Use Topics   â¢ Alcohol use: No     Alcohol/week: 0.0 standard drinks   â¢ Drug use: No       Family History   Problem Relation Age of Onset   â¢ Bipolar disorder Brother         twin brothers   â¢ Sickle Cell Trait Brother    â¢ Schizophrenia Brother         twin brothers   â¢ Sickle Cell Trait Brother              PHYSICAL EXAMINATION:  VITAL SIGNS:   Vitals:    06/15/21 1002   BP: 108/80   Pulse: 88   Resp: 12   Weight: 54.4 kg   LMP: 01/25/2021     GENERAL:  Well dressed and well developed. HEENT:  Normocephalic, atraumatic. Normal appearing sclerae and  Conjunctivae. NECK:  Pea size lymph node right posterior neck Supple and nontender. CARDIOVASCULAR:  Sinus tachycardia and regular rhythm. Normal S1, S2. No murmurs or  rubs. LUNGS: Clear to auscultation. No rales or wheezing. Breathing is  unlabored. EXTREMITIES:  No edema, cyanosis, or clubbing.   SKIN:  No rashes, digital ulcers, periungual erythema, nail pitting,  nodules, or sclerodactyly. MUSCULOSKELETAL:  Bilateral upper extremities:  No synovitis. Normal  range of motion. Hand  strenght 4+/5 on the left. Bilateral lower extremities:  No synovitis. Normal range of motion. Normal strength. SPINE:  No tenderness, left hip flexor 4+/5 strength. PSYCHOLOGICAL: Alert and oriented x3. Mood and affect are normal.    LABORATORY AND DIAGNOSTIC DATA:  Reviewed in Epic to date. 04/06/2020  CT scan of chest.   IMPRESSION:  1. No evidence of interstitial lung disease. No significant change from  November. 2.  Minimal subpleural groundglass centrilobular/tree-in-bud nodularity  suggesting bronchiolitis, unchanged from November. This could be  Postinflammatory. ASSESSMENT AND PLAN:  This is a 27year old female with:    1) Systemic lupus with history of severe lupus pneumonitis. Status post six doses of Cytoxan   -  Continue Cellcept 500 mg, take 2 tablets 2 times daily   -  Continue Plaquenil 200 mg, take 1 tablet 2 times daily Monday through Wednesday, take 1 tablet once daily Thursday through Sunday   - Continue methylprednisolone 4 mg daily take 1 tablet daily    -  Obtain dsDNA, complement 3, complement 4, sedimentation rate, c-reactive protein, cbc, cmp and urinalysis every 3 months   -  Repeat lupus anticoagulant antibody was negative. 2) Long-term use of immunosuppressants with drug toxicity monitoring    -  Obtain cbc and cmp every 3 months     3) Numbness in her left 4th and left 5th fingers and weakness in the left hand and left upper leg hip flexors with history of lupus.    -  Service to neurology ASAP  -  MRI of brain with and without contrast   -  MRI cervical spine with contrast     4) Anemia-stable     Follow up in 4 weeks    On 6/15/2021, Aleksey COURTNEY scribed the services personally performed by Pratik Duggan DO.     The documentation recorded by the scribe accurately and completely reflects the service(s) I personally performed and the decisions made by me.

## 2021-06-15 NOTE — PROGRESS NOTES
See OV 6/8    Future Appointments  6/15/2021  2:20 PM    Brando Nickerson MD     S[facility]P               [facility]  6/21/2021  7:20 AM    Tech, [facility]     [facility]             [facility]  6/21/2021  8:00 AM    MD JUAN Vega

## 2021-07-23 ENCOUNTER — LAB ENCOUNTER (OUTPATIENT)
Dept: LAB | Age: 21
End: 2021-07-23
Attending: INTERNAL MEDICINE
Payer: COMMERCIAL

## 2021-07-23 DIAGNOSIS — Z01.818 PRE-OP TESTING: ICD-10-CM

## 2021-07-24 LAB — SARS-COV-2 RNA RESP QL NAA+PROBE: NOT DETECTED

## 2021-07-26 PROBLEM — K21.9 GASTROESOPHAGEAL REFLUX DISEASE: Status: ACTIVE | Noted: 2021-07-26

## 2021-07-26 PROBLEM — R10.84 ABDOMINAL PAIN, GENERALIZED: Status: ACTIVE | Noted: 2021-07-26

## 2021-07-26 PROBLEM — K64.4 EXTERNAL HEMORRHOIDS WITHOUT COMPLICATION: Status: ACTIVE | Noted: 2021-07-26

## 2021-07-26 PROBLEM — R19.7 DIARRHEA, UNSPECIFIED: Status: ACTIVE | Noted: 2021-07-26

## 2021-07-26 PROBLEM — K64.8 INTERNAL HEMORRHOIDS: Status: ACTIVE | Noted: 2021-07-26

## 2021-07-26 PROBLEM — K29.70 GASTRITIS: Status: ACTIVE | Noted: 2021-07-26

## 2021-09-18 ENCOUNTER — HOSPITAL ENCOUNTER (OUTPATIENT)
Age: 21
Discharge: HOME | End: 2021-09-18
Payer: COMMERCIAL

## 2021-09-18 VITALS
SYSTOLIC BLOOD PRESSURE: 113 MMHG | BODY MASS INDEX: 19.12 KG/M2 | WEIGHT: 112 LBS | HEIGHT: 64 IN | RESPIRATION RATE: 16 BRPM | OXYGEN SATURATION: 100 % | TEMPERATURE: 98 F | DIASTOLIC BLOOD PRESSURE: 74 MMHG | HEART RATE: 65 BPM

## 2021-09-18 DIAGNOSIS — B34.9 VIRAL SYNDROME: Primary | ICD-10-CM

## 2021-09-18 DIAGNOSIS — R19.7 NAUSEA VOMITING AND DIARRHEA: ICD-10-CM

## 2021-09-18 DIAGNOSIS — R11.2 NAUSEA VOMITING AND DIARRHEA: ICD-10-CM

## 2021-09-18 LAB
POCT BILIRUBIN URINE: NEGATIVE
POCT BLOOD URINE: NEGATIVE
POCT GLUCOSE URINE: NEGATIVE MG/DL
POCT KETONE URINE: NEGATIVE MG/DL
POCT LEUKOCYTE ESTERASE URINE: NEGATIVE
POCT NITRITE URINE: NEGATIVE
POCT PH URINE: 6 (ref 5–8)
POCT PROTEIN URINE: NEGATIVE MG/DL
POCT SPECIFIC GRAVITY URINE: 1.02
POCT URINE CLARITY: CLEAR
POCT URINE COLOR: YELLOW
POCT UROBILINOGEN URINE: 0.2 MG/DL
SARS-COV-2 RNA RESP QL NAA+PROBE: NOT DETECTED

## 2021-09-18 PROCEDURE — 81002 URINALYSIS NONAUTO W/O SCOPE: CPT | Performed by: NURSE PRACTITIONER

## 2021-09-18 PROCEDURE — 99204 OFFICE O/P NEW MOD 45 MIN: CPT | Performed by: NURSE PRACTITIONER

## 2021-09-18 PROCEDURE — U0002 COVID-19 LAB TEST NON-CDC: HCPCS | Performed by: NURSE PRACTITIONER

## 2021-09-18 RX ORDER — ONDANSETRON 2 MG/ML
4 INJECTION INTRAMUSCULAR; INTRAVENOUS ONCE
Status: DISCONTINUED | OUTPATIENT
Start: 2021-09-18 | End: 2021-09-18

## 2021-09-18 RX ORDER — SODIUM CHLORIDE 9 MG/ML
1000 INJECTION, SOLUTION INTRAVENOUS ONCE
Status: DISCONTINUED | OUTPATIENT
Start: 2021-09-18 | End: 2021-09-18

## 2021-09-18 RX ORDER — ONDANSETRON 4 MG/1
4 TABLET, ORALLY DISINTEGRATING ORAL ONCE
Status: COMPLETED | OUTPATIENT
Start: 2021-09-18 | End: 2021-09-18

## 2021-09-18 NOTE — ED PROVIDER NOTES
Patient Seen in: Immediate 234       History   Patient presents with:  Sore Throat  Fever  Nausea/Vomiting/Diarrhea  Abdomen/Flank Pain    Stated Complaint: sore throat, runny nose, cough, fever, abdominal pain, fatigue    Subjective:   HPI  21-ye January 2021 2010-2011 was the start, teens worsened, after surgery-worst   • Flatulence/gas pain/belching May 2020    [address] [place] [facility] bad in 2012, worse now more then ever   • Food intolerance 2012    Dairy sensitivity   • Frequent urination 2018    Ever since No pertinent social history. Review of Systems   All other systems reviewed and are negative. Positive for stated complaint: sore throat, runny nose, cough, fever, abdominal pain, fatigue  Other systems are as noted in HPI.   Constitut that she did not want to wait and that she could go home and drink water while relaxing in her bed or in a warm bath. I discussed with patient that I could not assess her electrolyte or hydration status without labs. Patient would still like to go home.

## 2021-09-18 NOTE — ED INITIAL ASSESSMENT (HPI)
Pt sts sore throat, cough/cold symptoms, fatigue, abd pain n/v/d, fever as high as 102.8 began 3 days ago. Mid abd pain has been intermittent/cramping/sharp. 4-5 episodes of diarrhea per day, 2-3 episodes of vomiting per day.  Sts has had ongoing abd \"issu

## 2021-09-20 ENCOUNTER — HOSPITAL ENCOUNTER (OUTPATIENT)
Age: 21
Discharge: HOME | End: 2021-09-20
Payer: COMMERCIAL

## 2021-09-20 VITALS
SYSTOLIC BLOOD PRESSURE: 115 MMHG | HEIGHT: 64 IN | OXYGEN SATURATION: 98 % | DIASTOLIC BLOOD PRESSURE: 84 MMHG | RESPIRATION RATE: 18 BRPM | TEMPERATURE: 98 F | HEART RATE: 86 BPM | WEIGHT: 112 LBS | BODY MASS INDEX: 19.12 KG/M2

## 2021-09-20 DIAGNOSIS — R11.10 ABDOMINAL PAIN WITH VOMITING: ICD-10-CM

## 2021-09-20 DIAGNOSIS — R10.12 LUQ ABDOMINAL PAIN: Primary | ICD-10-CM

## 2021-09-20 DIAGNOSIS — R10.9 ABDOMINAL PAIN WITH VOMITING: ICD-10-CM

## 2021-09-20 LAB
#MXD IC: 0.5 X10ˆ3/UL (ref 0.1–1)
ALBUMIN SERPL-MCNC: 3.9 G/DL (ref 3.4–5)
ALP LIVER SERPL-CCNC: 68 U/L
ALT SERPL-CCNC: 19 U/L
AST SERPL-CCNC: 15 U/L (ref 15–37)
B-HCG UR QL: NEGATIVE
BILIRUB DIRECT SERPL-MCNC: 0.1 MG/DL (ref 0–0.2)
BILIRUB SERPL-MCNC: 0.3 MG/DL (ref 0.1–2)
BUN BLD-MCNC: 19 MG/DL (ref 7–18)
CHLORIDE BLD-SCNC: 107 MMOL/L (ref 98–112)
CO2 BLD-SCNC: 21 MMOL/L (ref 21–32)
CREAT BLD-MCNC: 0.7 MG/DL
GLUCOSE BLD-MCNC: 89 MG/DL (ref 70–99)
HCT VFR BLD AUTO: 42.1 %
HCT VFR BLD CALC: 43 %
HGB BLD-MCNC: 13.8 G/DL
ISTAT IONIZED CALCIUM FOR CHEM 8: 1.07 MMOL/L (ref 1.12–1.32)
LIPASE SERPL-CCNC: 93 U/L (ref 73–393)
LYMPHOCYTES # BLD AUTO: 2.6 X10ˆ3/UL (ref 1–4)
LYMPHOCYTES NFR BLD AUTO: 31.1 %
MCH RBC QN AUTO: 29.8 PG (ref 26–34)
MCHC RBC AUTO-ENTMCNC: 32.8 G/DL (ref 31–37)
MCV RBC AUTO: 90.9 FL (ref 80–100)
MIXED CELL %: 6 %
NEUTROPHILS # BLD AUTO: 5.1 X10ˆ3/UL (ref 1.5–7.7)
NEUTROPHILS NFR BLD AUTO: 62.9 %
PLATELET # BLD AUTO: 260 X10ˆ3/UL (ref 150–450)
POCT BILIRUBIN URINE: NEGATIVE
POCT BLOOD URINE: NEGATIVE
POCT GLUCOSE URINE: NEGATIVE MG/DL
POCT LEUKOCYTE ESTERASE URINE: NEGATIVE
POCT NITRITE URINE: NEGATIVE
POCT PH URINE: 6 (ref 5–8)
POCT PROTEIN URINE: NEGATIVE MG/DL
POCT SPECIFIC GRAVITY URINE: 1.03
POCT URINE CLARITY: CLEAR
POCT URINE COLOR: YELLOW
POCT UROBILINOGEN URINE: 0.2 MG/DL
POTASSIUM BLD-SCNC: 4.1 MMOL/L (ref 3.6–5.1)
PROT SERPL-MCNC: 7.1 G/DL (ref 6.4–8.2)
RBC # BLD AUTO: 4.63 X10ˆ6/UL
SODIUM BLD-SCNC: 141 MMOL/L (ref 136–145)
TROPONIN I BLD-MCNC: <0.02 NG/ML
WBC # BLD AUTO: 8.2 X10ˆ3/UL (ref 4–11)

## 2021-09-20 PROCEDURE — 81002 URINALYSIS NONAUTO W/O SCOPE: CPT | Performed by: PHYSICIAN ASSISTANT

## 2021-09-20 PROCEDURE — 84484 ASSAY OF TROPONIN QUANT: CPT | Performed by: PHYSICIAN ASSISTANT

## 2021-09-20 PROCEDURE — 99204 OFFICE O/P NEW MOD 45 MIN: CPT | Performed by: PHYSICIAN ASSISTANT

## 2021-09-20 PROCEDURE — 85025 COMPLETE CBC W/AUTO DIFF WBC: CPT | Performed by: PHYSICIAN ASSISTANT

## 2021-09-20 PROCEDURE — 81025 URINE PREGNANCY TEST: CPT | Performed by: PHYSICIAN ASSISTANT

## 2021-09-20 PROCEDURE — 80076 HEPATIC FUNCTION PANEL: CPT | Performed by: PHYSICIAN ASSISTANT

## 2021-09-20 PROCEDURE — 83690 ASSAY OF LIPASE: CPT | Performed by: PHYSICIAN ASSISTANT

## 2021-09-20 PROCEDURE — 80047 BASIC METABLC PNL IONIZED CA: CPT | Performed by: PHYSICIAN ASSISTANT

## 2021-09-20 PROCEDURE — 93000 ELECTROCARDIOGRAM COMPLETE: CPT | Performed by: PHYSICIAN ASSISTANT

## 2021-09-20 RX ORDER — LIDOCAINE HYDROCHLORIDE 20 MG/ML
10 SOLUTION OROPHARYNGEAL ONCE
Status: COMPLETED | OUTPATIENT
Start: 2021-09-20 | End: 2021-09-20

## 2021-09-20 RX ORDER — MAGNESIUM HYDROXIDE/ALUMINUM HYDROXICE/SIMETHICONE 120; 1200; 1200 MG/30ML; MG/30ML; MG/30ML
30 SUSPENSION ORAL ONCE
Status: COMPLETED | OUTPATIENT
Start: 2021-09-20 | End: 2021-09-20

## 2021-09-20 RX ORDER — ONDANSETRON 4 MG/1
4 TABLET, ORALLY DISINTEGRATING ORAL EVERY 4 HOURS PRN
Qty: 10 TABLET | Refills: 0 | Status: SHIPPED | OUTPATIENT
Start: 2021-09-20 | End: 2021-09-27

## 2021-09-20 RX ORDER — DICYCLOMINE HCL 20 MG
20 TABLET ORAL 4 TIMES DAILY PRN
Qty: 30 TABLET | Refills: 0 | Status: SHIPPED | OUTPATIENT
Start: 2021-09-20 | End: 2021-10-20

## 2021-09-20 NOTE — ED PROVIDER NOTES
Patient Seen in: Immediate [address] [facility]      History   Patient presents with:  Abdomen/Flank Pain  Chest Pain Angina    Stated Complaint: abdominal pain/travels up to chest and down left arm    Subjective:   HPI    25-year-old female presents to the immed childhood and teens, worsened after surgery   • Dizziness May 2021    Started in 2012, episodes through teens, woserened in may   • Extrinsic asthma, unspecified    • Fatigue January 2021 2010-2011 was the start, teens worsened, after surgery-worst   • Past Surgical History:   Procedure Laterality Date   • CHOLECYSTECTOMY  04/19/2021   • REMOVAL GALLBLADDER     • REMOVAL OF TONSILS,12+ Y/O     • TONSILLECTOMY                  Social History    Tobacco Use      Smoking status: Passive Smoke Exposure Right Ear: External ear normal.      Left Ear: External ear normal.      Nose: Nose normal.      Mouth/Throat:      Mouth: Mucous membranes are moist.      Pharynx: No oropharyngeal exudate. Eyes:      Extraocular Movements: Extraocular movements intact. axes as noted on EKG Report. Rate: 70 bpm  Rhythm: Sinus Rhythm  Reading: No STEMI                       MDM      42-year-old female presented to the immediate care for multiple complaints.   Patient states is mostly left upper quadrant pain going to the r abdominal pain  (primary encounter diagnosis)  Abdominal pain with vomiting     Disposition:  Discharge  9/20/2021  3:39 pm    Follow-up:  Cheng Zhang DO  [address] [place], [place] [id number] [phone]                Medications Prescrib

## 2021-09-20 NOTE — ED INITIAL ASSESSMENT (HPI)
Pt with c/o upper abdominal pain that radiates up into the chest.  Pt states pain then radiates down left arm. Pt has had n/v/d for the last few days. Pt was seen here this week.   Pt had recent endoscopy and diagnosed with hiatal hernia

## 2021-09-22 LAB
ATRIAL RATE: 70 BPM
P AXIS: 77 DEGREES
P-R INTERVAL: 120 MS
Q-T INTERVAL: 362 MS
QRS DURATION: 86 MS
QTC CALCULATION (BEZET): 390 MS
R AXIS: 78 DEGREES
T AXIS: 55 DEGREES
VENTRICULAR RATE: 70 BPM

## 2021-10-11 ENCOUNTER — HOSPITAL ENCOUNTER (EMERGENCY)
Age: 21
Discharge: HOME | End: 2021-10-11
Attending: EMERGENCY MEDICINE

## 2021-10-11 VITALS
WEIGHT: 110 LBS | HEART RATE: 76 BPM | RESPIRATION RATE: 16 BRPM | DIASTOLIC BLOOD PRESSURE: 60 MMHG | OXYGEN SATURATION: 99 % | TEMPERATURE: 100 F | SYSTOLIC BLOOD PRESSURE: 106 MMHG | BODY MASS INDEX: 18.78 KG/M2 | HEIGHT: 64 IN

## 2021-10-11 DIAGNOSIS — G89.29 CHRONIC ABDOMINAL PAIN: Primary | ICD-10-CM

## 2021-10-11 DIAGNOSIS — R10.9 CHRONIC ABDOMINAL PAIN: Primary | ICD-10-CM

## 2021-10-11 PROCEDURE — 99284 EMERGENCY DEPT VISIT MOD MDM: CPT

## 2021-10-11 PROCEDURE — S0028 INJECTION, FAMOTIDINE, 20 MG: HCPCS | Performed by: EMERGENCY MEDICINE

## 2021-10-11 PROCEDURE — 81025 URINE PREGNANCY TEST: CPT

## 2021-10-11 PROCEDURE — 96374 THER/PROPH/DIAG INJ IV PUSH: CPT

## 2021-10-11 PROCEDURE — 85025 COMPLETE CBC W/AUTO DIFF WBC: CPT | Performed by: EMERGENCY MEDICINE

## 2021-10-11 PROCEDURE — 83690 ASSAY OF LIPASE: CPT | Performed by: EMERGENCY MEDICINE

## 2021-10-11 PROCEDURE — 96361 HYDRATE IV INFUSION ADD-ON: CPT

## 2021-10-11 PROCEDURE — 80053 COMPREHEN METABOLIC PANEL: CPT | Performed by: EMERGENCY MEDICINE

## 2021-10-11 PROCEDURE — 96375 TX/PRO/DX INJ NEW DRUG ADDON: CPT

## 2021-10-11 RX ORDER — ONDANSETRON 2 MG/ML
4 INJECTION INTRAMUSCULAR; INTRAVENOUS ONCE
Status: COMPLETED | OUTPATIENT
Start: 2021-10-11 | End: 2021-10-11

## 2021-10-11 RX ORDER — HALOPERIDOL 5 MG/ML
5 INJECTION INTRAMUSCULAR ONCE
Status: COMPLETED | OUTPATIENT
Start: 2021-10-11 | End: 2021-10-11

## 2021-10-11 RX ORDER — KETOROLAC TROMETHAMINE 15 MG/ML
15 INJECTION, SOLUTION INTRAMUSCULAR; INTRAVENOUS ONCE
Status: COMPLETED | OUTPATIENT
Start: 2021-10-11 | End: 2021-10-11

## 2021-10-11 RX ORDER — FAMOTIDINE 10 MG/ML
20 INJECTION, SOLUTION INTRAVENOUS ONCE
Status: COMPLETED | OUTPATIENT
Start: 2021-10-11 | End: 2021-10-11

## 2021-10-11 NOTE — ED INITIAL ASSESSMENT (HPI)
To er with c/o upper abd pain for past 3. States pain is intermittent. Has been taking her meds without relief.

## 2021-10-11 NOTE — ED PROVIDER NOTES
Patient Seen in: THE [facility] In [place]      History   Patient presents with:  Abdomen/Flank Pain    Stated Complaint: ABD PAIN LEFT, RIGHT, MID FOR ABOUT 3 DAYS    Subjective:   HPI    44-year-old with a history of PTSD, anemia, asthm Bloating May 2020    Childhood bloating and gas, worse in teens, worse now   • Body piercing 2013    Ear piercings   • Change in hair Jan 2021    Brittle nails and hair, hair loss   • Chest pain 2012    Worsened in jan 2021, on & off in teens   • Chest pati Psychiatric disorder    • PTSD (post-traumatic stress disorder)    • Sleep disturbance 2012    Since i was a child, worse now   • Syncope June 1st 2021    Episodes in the past but 5 in the last week   • Uncomfortable fullness after meals [id number]    Early Abdomen: Soft, mildly tender diffusely, nondistended. Back: No CVA tenderness. Extremities: No edema.   Skin: warm and dry, no diaphoresis    ED Course     Labs Reviewed   COMP METABOLIC PANEL (14) - Abnormal; Notable for the following components: Grade III internal hemorrhoids. Large external hemorrhoids.     Diagnosis:  Internal Hemorrhoids. Hemorrhoids External  W/O C.      Fluids and Haldol ordered  Toradol and Zofran and Pepcid ordered         MDM      26-year-old with an exacerbation of chron

## 2024-05-22 NOTE — ED INITIAL ASSESSMENT (HPI)
Complains of lower abdominal \" cramping\" that radiates to the lower back onset this morning. Patient preformed a home pregnancy test last week that she reports was positive. Patient denies any vaginal bleeding or vaginal discharge.  Patient does admit to awake/alert/oriented to person, place, time/situation

## 2025-07-09 NOTE — ED AVS SNAPSHOT
THE [facility] Emergency Department in [address] [facility]    Phone:  [phone]    Fax:  [phone]           [facility]   MRN: [id number]    Department:  THE [facility] Emergency Department in [place]   Date of Quantity:  12 tablet   Commonly known as:  TORADOL   Take 1 tablet (10 mg total) by mouth every 6 (six) hours as needed for Pain. What changed: This medication is already on your medication list.  Do NOT take both doses of the new and old medication.   O You were examined and treated today on an urgent basis only. This was not a substitute for ongoing medical care. Often, one Emergency Department visit does not uncover every injury or illness.  If you have been referred to a primary care or a specialist ph [facility] [place] [address] (Anjel Soto) [address] [facility] [phone] [address] [facility] ([address]) [phone]                Additional Information       We are concerned VENTRICLES/SULCI:  Ventricles and sulci are normal in size. INTRACRANIAL:  There are no abnormal extraaxial fluid collections. There is no midline shift. There are no intraparenchymal brain abnormalities. There is nothing specific for acute infarct. [[id number]]

## (undated) DEVICE — VIOLET BRAIDED (POLYGLACTIN 910), SYNTHETIC ABSORBABLE SUTURE: Brand: COATED VICRYL

## (undated) DEVICE — ZIPWIRE GUIDEWIRE .035X150 STR

## (undated) DEVICE — SCD SLEEVE KNEE HI BLEND

## (undated) DEVICE — DISPOSABLE LAPAROSCOPIC CLIP APPLIER WITH 20 CLIPS.: Brand: EPIX® UNIVERSAL CLIP APPLIER

## (undated) DEVICE — REM POLYHESIVE ADULT PATIENT RETURN ELECTRODE: Brand: VALLEYLAB

## (undated) DEVICE — LAP CHOLE/APPY CDS-LF: Brand: MEDLINE INDUSTRIES, INC.

## (undated) DEVICE — C-ARM: Brand: UNBRANDED

## (undated) DEVICE — CHLORAPREP 26ML APPLICATOR

## (undated) DEVICE — Device

## (undated) DEVICE — TROCAR: Brand: KII FIOS FIRST ENTRY

## (undated) DEVICE — TROCAR: Brand: KII® SLEEVE

## (undated) DEVICE — TIGERTAIL 5F FLXTIP 70CM

## (undated) DEVICE — 40580 - THE PINK PAD - ADVANCED TRENDELENBURG POSITIONING KIT: Brand: 40580 - THE PINK PAD - ADVANCED TRENDELENBURG POSITIONING KIT

## (undated) DEVICE — TROCARS: Brand: KII® BALLOON BLUNT TIP SYSTEM

## (undated) DEVICE — STERILE SYNTHETIC POLYISOPRENE POWDER-FREE SURGICAL GLOVES WITH HYDROGEL COATING: Brand: PROTEXIS

## (undated) DEVICE — LIGHT HANDLE

## (undated) DEVICE — SUTURE MONOCRYL 4-0 PS-2

## (undated) DEVICE — DRAPE HALF 40X58 DYNJP2410

## (undated) DEVICE — CAUTERY PENCIL

## (undated) NOTE — ED AVS SNAPSHOT
[facility]   MRN: [id number]    Department:  [facility] Emergency Department in [place]   Date of Visit:  8/3/2018           Disclosure     Insurance plans vary and the physician(s) referred by the ER may not be covered by your plan.  Please tell this physician (or your personal doctor if your instructions are to return to your personal doctor) about any new or lasting problems. The primary care or specialist physician will see patients referred from the [facility] Emergency Department.  Cheryl Tejada

## (undated) NOTE — ED AVS SNAPSHOT
THE [facility] Emergency Department in [address] [facility]    Phone:  [phone]    Fax:  [phone]           [facility]   MRN: [id number]    Department:  THE [facility] Emergency Department in [place]   Date of IF THERE IS ANY CHANGE OR WORSENING OF YOUR CONDITION, CALL YOUR PRIMARY CARE PHYSICIAN AT ONCE OR RETURN IMMEDIATELY TO THE EMERGENCY DEPARTMENT.     If you have been prescribed any medication(s), please fill your prescription right away and begin taking

## (undated) NOTE — ED AVS SNAPSHOT
Rosi Evans   MRN: [id number]    Department:  [facility] in [place]   Date of Visit:  1/6/2019           Disclosure     Insurance plans vary and the physician(s) referred by the ER may not be covered by your plan.  Please contact tell this physician (or your personal doctor if your instructions are to return to your personal doctor) about any new or lasting problems. The primary care or specialist physician will see patients referred from the [facility] Emergency Department.  Indio Francisco

## (undated) NOTE — ED AVS SNAPSHOT
THE [facility] Emergency Department in [address] [facility]    Phone:  [phone]    Fax:  [phone]           [facility]   MRN: [id number]    Department:  THE [facility] Emergency Department in [place]   Date of Main [phone]  Pediatric [phone] Emergency Department   [phone]       To Check ER Wait Times:  TEXT 'ERwait' to 89221      Click [email or web address]      Or call [phone]    If you have any problems with your follow-up, ple before you leave. After you leave, you should follow the attached instructions. I have read and understand the instructions given to me by my caregivers. [facility]        Pharmacy Address Phone Number   [phone]  [address]. [employer] access allows you to view health information for your child from their recent   visit, view other health information and more. To sign up or find more information on getting   Proxy Access to your child’s [employer]chron[employer] go to [email or web address]

## (undated) NOTE — LETTER
Date: 11/18/2018    Patient Name: Mary Moss          To Whom it may concern: The above patient was seen at the [facility] for treatment of a medical condition. This patient should be excused from attending work until 11/20/18.

## (undated) NOTE — ED AVS SNAPSHOT
Krzysztof Parsons   MRN: [id number]    Department:  THE [facility] Emergency Department in [place]   Date of Visit:  12/10/2019           Disclosure     Insurance plans vary and the physician(s) referred by the ER may not be covered by your plan.  Please contac tell this physician (or your personal doctor if your instructions are to return to your personal doctor) about any new or lasting problems. The primary care or specialist physician will see patients referred from the [facility] Emergency Department.  Vanessa Trotter

## (undated) NOTE — ED AVS SNAPSHOT
[facility] Emergency Department    [place]  [address] [address]    Phone:  [phone]    Fax:  [phone]           [facility]   MRN: [id number]    Department:  [facility] Emergency Department   Date of Visit IF THERE IS ANY CHANGE OR WORSENING OF YOUR CONDITION, CALL YOUR PRIMARY CARE PHYSICIAN AT ONCE OR RETURN IMMEDIATELY TO THE EMERGENCY DEPARTMENT.     If you have been prescribed any medication(s), please fill your prescription right away and begin taking t

## (undated) NOTE — LETTER
Date & Time: 3/18/2019, 10:30 AM  Patient: Catracho Reyes    Dear Catracho Reyes,    We have not been able to contact you at your telephone number [phone] (home) listed in our records after multiple attempts.  This is regarding your 3/11/2019 visit a

## (undated) NOTE — ED AVS SNAPSHOT
Sawyer Mart   MRN: [id number]    Department:  [facility] Emergency Department in [place]   Date of Visit:  10/19/2019           Disclosure     Insurance plans vary and the physician(s) referred by the ER may not be covered by your plan.  Please contac tell this physician (or your personal doctor if your instructions are to return to your personal doctor) about any new or lasting problems. The primary care or specialist physician will see patients referred from the [facility] Emergency Department.  Estephanie Soni

## (undated) NOTE — ED AVS SNAPSHOT
Joesph Ingram   MRN: [id number]    Department:  [address] [facility] Department in [place]   Date of Visit:  8/30/2019           Disclosure     Insurance plans vary and the physician(s) referred by the ER may not be covered by your plan.  Please contac tell this physician (or your personal doctor if your instructions are to return to your personal doctor) about any new or lasting problems. The primary care or specialist physician will see patients referred from the [facility] Emergency Department.  Klever Horton

## (undated) NOTE — ED AVS SNAPSHOT
[facility] in [address] [facility]    Phone:  [phone]    Fax:  [phone]           Renetta [facility]ave [facility]   MRN: [id number]    Department:  [facility] in [place]   Date of To Check ER Wait Times:  TEXT 'ERwait' to 85935      Click [email or web address]      Or call [phone]    If you have any problems with your follow-up, please call our  at [phone]    Si usted tiene algun problema con esteves sequimiento, por f I have read and understand the instructions given to me by my caregivers. 24[facility]our Pharmacies        Pharmacy Address Phone Number   [facility] [address] ([address]) Rodney Barnes PROCEDURE:  US PREGNANCY <14 WEEKS (TRANSABDOMINAL/TRANSVAGINAL)(CPT=76801)     COMPARISON:  None. INDICATIONS:  41-year-old pregnant female presents with Generalized pelvic pain/discomfort for one day.      TECHNIQUE:  Transabdominal / pelvic ultrasou

## (undated) NOTE — ED AVS SNAPSHOT
[facility]   MRN: [id number]    Department:  THE [facility] Emergency Department in [place]   Date of Visit:  3/5/2018           Disclosure     Insurance plans vary and the physician(s) referred by the ER may not be covered by your plan.  Please tell this physician (or your personal doctor if your instructions are to return to your personal doctor) about any new or lasting problems. The primary care or specialist physician will see patients referred from the [facility] Emergency Department.  Shaheen Dhaliwal

## (undated) NOTE — ED AVS SNAPSHOT
[facility] Emergency Department    [place]  [address] [address]    Phone:  [phone]    Fax:  [phone]           [facility]   MRN: [id number]    Department:  [facility] Emergency Department   Date of Visit IF THERE IS ANY CHANGE OR WORSENING OF YOUR CONDITION, CALL YOUR PRIMARY CARE PHYSICIAN AT ONCE OR RETURN IMMEDIATELY TO THE EMERGENCY DEPARTMENT.     If you have been prescribed any medication(s), please fill your prescription right away and begin taking t

## (undated) NOTE — ED AVS SNAPSHOT
THE [facility] Emergency Department in [address] [facility]    Phone:  [phone]    Fax:  [phone]           [facility]   MRN: [id number]    Department:  THE [facility] Emergency Department in [place]   Date of IF THERE IS ANY CHANGE OR WORSENING OF YOUR CONDITION, CALL YOUR PRIMARY CARE PHYSICIAN AT ONCE OR RETURN IMMEDIATELY TO THE EMERGENCY DEPARTMENT.     If you have been prescribed any medication(s), please fill your prescription right away and begin taking

## (undated) NOTE — ED AVS SNAPSHOT
[facility] in [address] [facility]    Phone:  [phone]    Fax:  [phone]           [facility]   MRN: [id number]    Department:  [facility] Department in [place]   Date of self-assessment the day after your visit. You may also receive a call from our patient liason soon after your visit. Also, some patients receive a detailed feedback survey mailed to them a week after the visit.   If you receive this, we would really apprec [facility] [address] (68 [facility] [id number] [address] ([address]) [facility] [address]. 176 [facility]. ([address]) [facility]

## (undated) NOTE — ED AVS SNAPSHOT
THE [facility] Emergency Department in [address] [facility]    Phone:  [phone]    Fax:  [phone]           [facility]   MRN: [id number]    Department:  THE [facility] Emergency Department in [place]   Date of IF THERE IS ANY CHANGE OR WORSENING OF YOUR CONDITION, CALL YOUR PRIMARY CARE PHYSICIAN AT ONCE OR RETURN IMMEDIATELY TO THE EMERGENCY DEPARTMENT.     If you have been prescribed any medication(s), please fill your prescription right away and begin taking

## (undated) NOTE — ED AVS SNAPSHOT
[facility] Department in [address] [facility]    Phone:  [phone]    Fax:  [phone]           [facility]   MRN: [id number]    Department:  [facility] in [place]   Date of IF THERE IS ANY CHANGE OR WORSENING OF YOUR CONDITION, CALL YOUR PRIMARY CARE PHYSICIAN AT ONCE OR RETURN IMMEDIATELY TO THE EMERGENCY DEPARTMENT.     If you have been prescribed any medication(s), please fill your prescription right away and begin taking

## (undated) NOTE — ED AVS SNAPSHOT
Krzysztof Parsons   MRN: [id number]    Department:  THE [facility] Emergency Department in [place]   Date of Visit:  2/20/2020           Disclosure     Insurance plans vary and the physician(s) referred by the ER may not be covered by your plan.  Please contact tell this physician (or your personal doctor if your instructions are to return to your personal doctor) about any new or lasting problems. The primary care or specialist physician will see patients referred from the [facility] Emergency Department.  Elpidio Lange

## (undated) NOTE — LETTER
[facility]AN for [facility]     : 1/10/2000     Date: 2018  Provider:  Katie Bashir DO  Phone for doctor or clinic: [facility], [address] [facility], [address] [facility] [address] [place] [address] [place], [place] [phone]

## (undated) NOTE — ED AVS SNAPSHOT
THE [facility] Emergency Department in [address] [facility]    Phone:  [phone]    Fax:  [phone]           [facility]   MRN: [id number]    Department:  THE [facility] Emergency Department in [place]   Date of IF THERE IS ANY CHANGE OR WORSENING OF YOUR CONDITION, CALL YOUR PRIMARY CARE PHYSICIAN AT ONCE OR RETURN IMMEDIATELY TO THE EMERGENCY DEPARTMENT.     If you have been prescribed any medication(s), please fill your prescription right away and begin taking t

## (undated) NOTE — ED AVS SNAPSHOT
Daryleclover Copeland   MRN: [id number]    Department:  [facility] in [place]   Date of Visit:  1/20/2020           Disclosure     Insurance plans vary and the physician(s) referred by the ER may not be covered by your plan.  Please contact tell this physician (or your personal doctor if your instructions are to return to your personal doctor) about any new or lasting problems. The primary care or specialist physician will see patients referred from the [facility] Emergency Department.  Priyanka Nunez

## (undated) NOTE — LETTER
21    Patient: Shar Urban  : 1/10/2000 Visit date: 3/31/2021    Dear  Dinorah Kay, DO    Thank you for referring Shar Urban to my practice. Please find my assessment and plan below.      Assessment   Calculus of gallbladder

## (undated) NOTE — ED AVS SNAPSHOT
Donna \[facility] [place] [facility]\""   MRN: [id number]    Department:  [facility] Emergency Department   Date of Visit:  8/5/2018           Disclosure     Insurance plans vary and the physician(s) referred by the ER may not be covered by your plan.  Please conta tell this physician (or your personal doctor if your instructions are to return to your personal doctor) about any new or lasting problems. The primary care or specialist physician will see patients referred from the [facility] Emergency Department.  Johan Barrios

## (undated) NOTE — IP AVS SNAPSHOT
Patient Demographics     Address Phone E-mail Address    Sanjay [address] [id number] [facility])  [phone] [facility] *Preferred* [email or web address]      Emergency Contact(s)     Name Relation Home Work [address] [phone] 0.9%  NaCl infusion 02/04/17 1029 New Bag      [id number] DiphenhydrAMINE HCl (BENADRYL) injection 50 mg 02/03/17 1936 Given      [id number] HYDROmorphone HCl PF (DILAUDID) 1 MG/ML injection 0.1 mg 02/04/17 1028 Given      [id number] HYDROmorphone Lab Results Last 24 Hours (02/03/17 - 02/03/17)      COMP METABOLIC PANEL (14) [[id number]] (Abnormal)  Resulted: 02/03/17 2004, Result status: Final result    Ordering provider: Ivet Rothman MD  02/03/17 1920 Resulting lab: [facility]    Specimen Narrative: The following orders were created for panel order CBC WITH DIFFERENTIAL WITH PLATELET.   Procedure                               Abnormality         Status                     ---------                               -----------         --- for acute exacerbations of headache. It was also recommended that the child follow up with [facility] [place] [facility] for further recommendations.   The child states that she no longer takes the amitriptyline since she felt it was making her headache continued to complain of 10 out of 10 headache. She then received 2 doses of Dilaudid with reported minimal improvement of symptoms. A CT of the head was done which was negative. The child was then admitted to the pediatric floor for further management. and maternal grandmother; Diabetes in her paternal grandfather; Heart Disease in her paternal grandfather.     VITAL SIGNS:  /70 mmHg  Pulse 75  Temp(Src) 97.8 °F (36.6 °C) (Temporal)  Resp 18  Wt 138 lb 7.2 oz (62.8 kg)  SpO2 98%  LMP 02/03/2017    P pediatric emergency room for management of status migrainosus. The child currently rates her pain as a 10 out of 10-but appears otherwise comfortable with no tachycardia, hypertension, and appears to be speaking comfortably.   The case was discussed at misael

## (undated) NOTE — ED AVS SNAPSHOT
[facility] Emergency Department    [place]  [address] [address]    Phone:  [phone]    Fax:  [phone]           [facility]   MRN: [id number]    Department:  [facility] Emergency Department   Date of Visit covered by your plan. Please contact your insurance company to determine coverage for follow-up care and referrals.     [address] [facility] [place] [phone]  Pediatric [phone] Emergency Department   [phone]       To by a radiologist.  If there is a significant change in your reading, you will be contacted. Please make sure we have your correct phone number before you leave. After you leave, you should follow the attached instructions.      I have read and understand th Tasha 112. Imaging Results         CT BRAIN OR HEAD (67100) (Final result) Result time:  01/03/17 07:50:47    Final result    Impression:    CONCLUSION:  Normal for a patient of this age.              Dictated by: Stevie Sanz MD on Sign Up Forms link in the Additional Information box on the right. [employer] Questions? Call [phone] for help. [employer] is NOT to be used for urgent needs. For medical emergencies, dial 911.

## (undated) NOTE — ED AVS SNAPSHOT
[facility] Emergency Department    [place]  [address] [address]    Phone:  [phone]    Fax:  [phone]           [facility]   MRN: [id number]    Department:  [facility] Emergency Department   Date of Visit side effects. Medication List      START taking these medications     Ciprofloxacin HCl 500 MG Tabs   Quantity:  6 tablet   Commonly known as:  CIPRO   Take 1 tablet (500 mg total) by mouth 2 (two) times daily.        Ketorolac Tromethamine 10 self-assessment the day after your visit. You may also receive a call from our patient liason soon after your visit. Also, some patients receive a detailed feedback survey mailed to them a week after the visit.   If you receive this, we would really apprec [facility] [address] [facility] [address] (68 [facility] [id number] [address] ([address]) [facility] [address]. 176 [facility]. ([address]) [facility]

## (undated) NOTE — ED AVS SNAPSHOT
[facility] [facility] Emergency Department in [address] [place] [address]    Phone:  [phone]    Fax:  [phone]           [facility]   MRN: [id number]    Department:  [address] Emergency Department in [place]   Date of IF THERE IS ANY CHANGE OR WORSENING OF YOUR CONDITION, CALL YOUR PRIMARY CARE PHYSICIAN AT ONCE OR RETURN IMMEDIATELY TO THE EMERGENCY DEPARTMENT.     If you have been prescribed any medication(s), please fill your prescription right away and begin taking

## (undated) NOTE — ED AVS SNAPSHOT
[facility]   MRN: [id number]    Department:  THE [facility] Emergency Department in [place]   Date of Visit:  4/3/2018           Disclosure     Insurance plans vary and the physician(s) referred by the ER may not be covered by your plan.  Please tell this physician (or your personal doctor if your instructions are to return to your personal doctor) about any new or lasting problems. The primary care or specialist physician will see patients referred from the [facility] Emergency Department.  Caio Kramer

## (undated) NOTE — LETTER
[facility]    Consent for Coronary CT Angiography    Your doctor has recommended that you have a Coronary CT Angiography procedure.  Coronary CT Angiography is a diagnostic procedure using computed tomography to scan the can range from very minor to very serious leading to a life threatening situation or even death. Please be sure to communicate any allergy you may have to your caregiver immediately.     The information that is obtained during your testing will be treated a

## (undated) NOTE — ED AVS SNAPSHOT
Viktoriya Gamez [facility]   MRN: [id number]    Department:  THE [facility] Emergency Department in [place]   Date of Visit:  8/1/2018           Disclosure     Insurance plans vary and the physician(s) referred by the ER may not be covered by your plan.  Please tell this physician (or your personal doctor if your instructions are to return to your personal doctor) about any new or lasting problems. The primary care or specialist physician will see patients referred from the [facility] Emergency Department.  Vanessa Trotter

## (undated) NOTE — ED AVS SNAPSHOT
[facility] Department in [address] [facility]    Phone:  [phone]    Fax:  [phone]           [facility]   MRN: [id number]    Department:  [facility] Emergency Department in [place]   Date of Insurance plans vary and the physician(s) referred by the ER may not be covered by your plan. Please contact your insurance company to determine coverage for follow-up care and referrals.     [facility]  Main [phone]  Pediatric If the emergency physician has read X-rays, these will be re-interpreted by a radiologist.  If there is a significant change in your reading, you will be contacted. Please make sure we have your correct phone number before you leave.  After you leave, you s and ask to get set up for an insurance coverage that is in-network with Tasha Avila. [employer]     Sign up for [employer] access for your child.   [employer] access allows you to view health information for your child from their recent   visit, vi

## (undated) NOTE — ED AVS SNAPSHOT
THE [facility] Emergency Department in [address] [facility]  Phone:  [phone]  Fax:  [phone]          [facility]   MRN: [id number]    Department:  THE [facility] Emergency Department in [place]   Date of Visit:  6 IF THERE IS ANY CHANGE OR WORSENING OF YOUR CONDITION, CALL YOUR PRIMARY CARE PHYSICIAN AT ONCE OR RETURN IMMEDIATELY TO THE EMERGENCY DEPARTMENT.     If you have been prescribed any medication(s), please fill your prescription right away and begin taking t

## (undated) NOTE — LETTER
[address], [address] [facility] 9660  [address]   [phone] [address] [facility], [address] [place] [address], [address] [place] [address]   [phone]       May 14, 2021    Lori Ang

## (undated) NOTE — LETTER
Date & Time: 9/18/2021, 11:57 AM  Patient: Ravi Grant  Encounter Provider(s):    FABY French       To Whom It May Concern:    Noah Lanza was seen and treated in our department on 9/18/2021. She should not return to work until 9/21/21.

## (undated) NOTE — ED AVS SNAPSHOT
THE [facility] Emergency Department in [address] [facility]    Phone:  [phone]    Fax:  [phone]           [facility]   MRN: [id number]    Department:  THE [facility] Emergency Department in [place]   Date of Commonly known as:  ZOFRAN-ODT   Take 1 tablet (4 mg total) by mouth every 4 (four) hours as needed for Nausea. What changed: This medication is already on your medication list.  Do NOT take both doses of the new and old medication.   ONLY take the dose to a primary care or a specialist physician for a follow-up visit, please tell this physician (or your personal doctor if your instructions are to return to your personal doctor) about any new or lasting problems.  The primary care or specialist physician w We are concerned for your overall well being:    - If you are a smoker or have smoked in the last 12 months, we encourage you to explore options for quitting.     - If you have concerns related to behavioral health issues or thoughts of harming yourself,

## (undated) NOTE — ED AVS SNAPSHOT
Tiburcio Rodriguez   MRN: [id number]    Department:  THE [facility] Emergency Department in [place]   Date of Visit:  12/11/2019           Disclosure     Insurance plans vary and the physician(s) referred by the ER may not be covered by your plan.  Please contac tell this physician (or your personal doctor if your instructions are to return to your personal doctor) about any new or lasting problems. The primary care or specialist physician will see patients referred from the [facility] Emergency Department.  Bebeto Herman

## (undated) NOTE — ED AVS SNAPSHOT
Bridgett Billy   MRN: [id number]    Department:  THE [facility] Emergency Department in [place]   Date of Visit:  9/29/2019           Disclosure     Insurance plans vary and the physician(s) referred by the ER may not be covered by your plan.  Please contact tell this physician (or your personal doctor if your instructions are to return to your personal doctor) about any new or lasting problems. The primary care or specialist physician will see patients referred from the [facility] Emergency Department.  Riuz Salazar